# Patient Record
Sex: FEMALE | Race: WHITE | Employment: OTHER | URBAN - METROPOLITAN AREA
[De-identification: names, ages, dates, MRNs, and addresses within clinical notes are randomized per-mention and may not be internally consistent; named-entity substitution may affect disease eponyms.]

---

## 2018-04-26 ENCOUNTER — HOSPITAL ENCOUNTER (OUTPATIENT)
Age: 63
Discharge: HOME | End: 2018-04-26
Payer: MEDICARE

## 2018-04-26 DIAGNOSIS — Z00.00 ROUTINE GENERAL MEDICAL EXAMINATION AT A HEALTH CARE FACILITY: ICD-10-CM

## 2018-04-26 LAB
ALBUMIN SERPL-MCNC: 4.4 G/DL (ref 3.5–5.2)
ALP BLD-CCNC: 54 U/L (ref 35–104)
ALT SERPL-CCNC: 14 U/L (ref 0–32)
ANION GAP SERPL CALCULATED.3IONS-SCNC: 9 MMOL/L (ref 7–16)
AST SERPL-CCNC: 17 U/L (ref 0–31)
BASOPHILS ABSOLUTE: 0.05 E9/L (ref 0–0.2)
BASOPHILS RELATIVE PERCENT: 1.2 % (ref 0–2)
BILIRUB SERPL-MCNC: 1 MG/DL (ref 0–1.2)
BUN BLDV-MCNC: 21 MG/DL (ref 8–23)
CALCIUM SERPL-MCNC: 9.6 MG/DL (ref 8.6–10.2)
CHLORIDE BLD-SCNC: 99 MMOL/L (ref 98–107)
CHOLESTEROL, TOTAL: 154 MG/DL (ref 0–199)
CO2: 29 MMOL/L (ref 22–29)
CREAT SERPL-MCNC: 0.8 MG/DL (ref 0.5–1)
EOSINOPHILS ABSOLUTE: 0.13 E9/L (ref 0.05–0.5)
EOSINOPHILS RELATIVE PERCENT: 3.1 % (ref 0–6)
GFR AFRICAN AMERICAN: >60
GFR NON-AFRICAN AMERICAN: >60 ML/MIN/1.73
GLUCOSE BLD-MCNC: 90 MG/DL (ref 74–109)
HCT VFR BLD CALC: 39.1 % (ref 34–48)
HDLC SERPL-MCNC: 63 MG/DL
HEMOGLOBIN: 13.3 G/DL (ref 11.5–15.5)
IMMATURE GRANULOCYTES #: 0.01 E9/L
IMMATURE GRANULOCYTES %: 0.2 % (ref 0–5)
LDL CHOLESTEROL CALCULATED: 75 MG/DL (ref 0–99)
LYMPHOCYTES ABSOLUTE: 1.55 E9/L (ref 1.5–4)
LYMPHOCYTES RELATIVE PERCENT: 36.4 % (ref 20–42)
MCH RBC QN AUTO: 30.6 PG (ref 26–35)
MCHC RBC AUTO-ENTMCNC: 34 % (ref 32–34.5)
MCV RBC AUTO: 89.9 FL (ref 80–99.9)
MONOCYTES ABSOLUTE: 0.46 E9/L (ref 0.1–0.95)
MONOCYTES RELATIVE PERCENT: 10.8 % (ref 2–12)
NEUTROPHILS ABSOLUTE: 2.06 E9/L (ref 1.8–7.3)
NEUTROPHILS RELATIVE PERCENT: 48.3 % (ref 43–80)
PDW BLD-RTO: 12.1 FL (ref 11.5–15)
PLATELET # BLD: 172 E9/L (ref 130–450)
PMV BLD AUTO: 9.7 FL (ref 7–12)
POTASSIUM SERPL-SCNC: 4 MMOL/L (ref 3.5–5)
RBC # BLD: 4.35 E12/L (ref 3.5–5.5)
SODIUM BLD-SCNC: 137 MMOL/L (ref 132–146)
TOTAL PROTEIN: 7.5 G/DL (ref 6.4–8.3)
TRIGL SERPL-MCNC: 78 MG/DL (ref 0–149)
VLDLC SERPL CALC-MCNC: 16 MG/DL
WBC # BLD: 4.3 E9/L (ref 4.5–11.5)

## 2018-04-26 PROCEDURE — 36415 COLL VENOUS BLD VENIPUNCTURE: CPT

## 2018-04-26 PROCEDURE — 85025 COMPLETE CBC W/AUTO DIFF WBC: CPT

## 2018-04-26 PROCEDURE — 80053 COMPREHEN METABOLIC PANEL: CPT

## 2018-04-26 PROCEDURE — 80061 LIPID PANEL: CPT

## 2019-04-23 ENCOUNTER — HOSPITAL ENCOUNTER (OUTPATIENT)
Age: 64
Discharge: HOME | End: 2019-04-23
Payer: MEDICARE

## 2019-04-23 DIAGNOSIS — E78.00 PURE HYPERCHOLESTEROLEMIA: ICD-10-CM

## 2019-04-23 LAB
ALBUMIN SERPL-MCNC: 4.6 G/DL (ref 3.5–5.2)
ALP BLD-CCNC: 58 U/L (ref 35–104)
ALT SERPL-CCNC: 15 U/L (ref 0–32)
ANION GAP SERPL CALCULATED.3IONS-SCNC: 8 MMOL/L (ref 7–16)
AST SERPL-CCNC: 20 U/L (ref 0–31)
BILIRUB SERPL-MCNC: 0.7 MG/DL (ref 0–1.2)
BUN BLDV-MCNC: 19 MG/DL (ref 8–23)
CALCIUM SERPL-MCNC: 9.5 MG/DL (ref 8.6–10.2)
CHLORIDE BLD-SCNC: 100 MMOL/L (ref 98–107)
CHOLESTEROL, TOTAL: 174 MG/DL (ref 0–199)
CO2: 29 MMOL/L (ref 22–29)
CREAT SERPL-MCNC: 0.8 MG/DL (ref 0.5–1)
GFR AFRICAN AMERICAN: >60
GFR NON-AFRICAN AMERICAN: >60 ML/MIN/1.73
GLUCOSE BLD-MCNC: 89 MG/DL (ref 74–99)
HCT VFR BLD CALC: 39 % (ref 34–48)
HDLC SERPL-MCNC: 56 MG/DL
HEMOGLOBIN: 13.4 G/DL (ref 11.5–15.5)
LDL CHOLESTEROL CALCULATED: 91 MG/DL (ref 0–99)
MCH RBC QN AUTO: 31.1 PG (ref 26–35)
MCHC RBC AUTO-ENTMCNC: 34.4 % (ref 32–34.5)
MCV RBC AUTO: 90.5 FL (ref 80–99.9)
PDW BLD-RTO: 12.1 FL (ref 11.5–15)
PLATELET # BLD: 186 E9/L (ref 130–450)
PMV BLD AUTO: 9.8 FL (ref 7–12)
POTASSIUM SERPL-SCNC: 4 MMOL/L (ref 3.5–5)
RBC # BLD: 4.31 E12/L (ref 3.5–5.5)
SODIUM BLD-SCNC: 137 MMOL/L (ref 132–146)
TOTAL PROTEIN: 7.5 G/DL (ref 6.4–8.3)
TRIGL SERPL-MCNC: 133 MG/DL (ref 0–149)
VLDLC SERPL CALC-MCNC: 27 MG/DL
WBC # BLD: 5.5 E9/L (ref 4.5–11.5)

## 2019-04-23 PROCEDURE — 80061 LIPID PANEL: CPT

## 2019-04-23 PROCEDURE — 36415 COLL VENOUS BLD VENIPUNCTURE: CPT

## 2019-04-23 PROCEDURE — 80053 COMPREHEN METABOLIC PANEL: CPT

## 2019-04-23 PROCEDURE — 85027 COMPLETE CBC AUTOMATED: CPT

## 2020-05-04 ENCOUNTER — HOSPITAL ENCOUNTER (OUTPATIENT)
Age: 65
Discharge: HOME | End: 2020-05-04
Payer: MEDICARE

## 2020-05-04 LAB
ALBUMIN SERPL-MCNC: 4.6 G/DL (ref 3.5–5.2)
ALP BLD-CCNC: 65 U/L (ref 35–104)
ALT SERPL-CCNC: 11 U/L (ref 0–32)
ANION GAP SERPL CALCULATED.3IONS-SCNC: 9 MMOL/L (ref 7–16)
AST SERPL-CCNC: 19 U/L (ref 0–31)
BILIRUB SERPL-MCNC: 0.7 MG/DL (ref 0–1.2)
BUN BLDV-MCNC: 17 MG/DL (ref 8–23)
CALCIUM SERPL-MCNC: 9.7 MG/DL (ref 8.6–10.2)
CHLORIDE BLD-SCNC: 101 MMOL/L (ref 98–107)
CHOLESTEROL, TOTAL: 164 MG/DL (ref 0–199)
CO2: 28 MMOL/L (ref 22–29)
CREAT SERPL-MCNC: 0.9 MG/DL (ref 0.5–1)
GFR AFRICAN AMERICAN: >60
GFR NON-AFRICAN AMERICAN: >60 ML/MIN/1.73
GLUCOSE BLD-MCNC: 96 MG/DL (ref 74–99)
HCT VFR BLD CALC: 38 % (ref 34–48)
HDLC SERPL-MCNC: 58 MG/DL
HEMOGLOBIN: 13 G/DL (ref 11.5–15.5)
LDL CHOLESTEROL CALCULATED: 85 MG/DL (ref 0–99)
MCH RBC QN AUTO: 30.8 PG (ref 26–35)
MCHC RBC AUTO-ENTMCNC: 34.2 % (ref 32–34.5)
MCV RBC AUTO: 90 FL (ref 80–99.9)
PDW BLD-RTO: 12 FL (ref 11.5–15)
PLATELET # BLD: 211 E9/L (ref 130–450)
PMV BLD AUTO: 9.4 FL (ref 7–12)
POTASSIUM SERPL-SCNC: 4.5 MMOL/L (ref 3.5–5)
RBC # BLD: 4.22 E12/L (ref 3.5–5.5)
SODIUM BLD-SCNC: 138 MMOL/L (ref 132–146)
TOTAL PROTEIN: 7.9 G/DL (ref 6.4–8.3)
TRIGL SERPL-MCNC: 105 MG/DL (ref 0–149)
VLDLC SERPL CALC-MCNC: 21 MG/DL
WBC # BLD: 5.7 E9/L (ref 4.5–11.5)

## 2020-05-04 PROCEDURE — 36415 COLL VENOUS BLD VENIPUNCTURE: CPT

## 2020-05-04 PROCEDURE — 80053 COMPREHEN METABOLIC PANEL: CPT

## 2020-05-04 PROCEDURE — 80061 LIPID PANEL: CPT

## 2020-05-04 PROCEDURE — 85027 COMPLETE CBC AUTOMATED: CPT

## 2020-06-16 ENCOUNTER — HOSPITAL ENCOUNTER (OUTPATIENT)
Age: 65
Discharge: HOME | End: 2020-06-16
Payer: MEDICARE

## 2020-06-16 LAB
C-REACTIVE PROTEIN: <0.1 MG/DL (ref 0–0.4)
RHEUMATOID FACTOR: 10 IU/ML (ref 0–13)
SEDIMENTATION RATE, ERYTHROCYTE: 5 MM/HR (ref 0–20)

## 2020-06-16 PROCEDURE — 86038 ANTINUCLEAR ANTIBODIES: CPT

## 2020-06-16 PROCEDURE — 85651 RBC SED RATE NONAUTOMATED: CPT

## 2020-06-16 PROCEDURE — 86431 RHEUMATOID FACTOR QUANT: CPT

## 2020-06-16 PROCEDURE — 86039 ANTINUCLEAR ANTIBODIES (ANA): CPT

## 2020-06-16 PROCEDURE — 86140 C-REACTIVE PROTEIN: CPT

## 2020-06-16 PROCEDURE — 36415 COLL VENOUS BLD VENIPUNCTURE: CPT

## 2020-06-18 LAB
ANA PATTERN: ABNORMAL
ANA TITER: ABNORMAL
ANTI-NUCLEAR ANTIBODY (ANA): POSITIVE

## 2020-07-08 ENCOUNTER — HOSPITAL ENCOUNTER (OUTPATIENT)
Dept: GENERAL RADIOLOGY | Age: 65
Discharge: HOME | End: 2020-07-10
Payer: MEDICARE

## 2020-07-08 ENCOUNTER — HOSPITAL ENCOUNTER (OUTPATIENT)
Age: 65
Discharge: HOME | End: 2020-07-08
Payer: MEDICARE

## 2020-07-08 ENCOUNTER — OFFICE VISIT (OUTPATIENT)
Dept: NEUROLOGY | Age: 65
End: 2020-07-08
Payer: MEDICARE

## 2020-07-08 ENCOUNTER — HOSPITAL ENCOUNTER (OUTPATIENT)
Age: 65
Discharge: HOME | End: 2020-07-10
Payer: MEDICARE

## 2020-07-08 VITALS
WEIGHT: 120 LBS | BODY MASS INDEX: 22.66 KG/M2 | SYSTOLIC BLOOD PRESSURE: 130 MMHG | DIASTOLIC BLOOD PRESSURE: 100 MMHG | TEMPERATURE: 98.7 F | HEIGHT: 61 IN

## 2020-07-08 PROBLEM — M54.42 CHRONIC BILATERAL LOW BACK PAIN WITH SCIATICA: Chronic | Status: ACTIVE | Noted: 2020-07-08

## 2020-07-08 PROBLEM — R20.2 NUMBNESS AND TINGLING IN BOTH HANDS: Status: ACTIVE | Noted: 2020-07-08

## 2020-07-08 PROBLEM — M54.40 CHRONIC BILATERAL LOW BACK PAIN WITH SCIATICA: Chronic | Status: ACTIVE | Noted: 2020-07-08

## 2020-07-08 PROBLEM — M54.41 CHRONIC BILATERAL LOW BACK PAIN WITH SCIATICA: Chronic | Status: ACTIVE | Noted: 2020-07-08

## 2020-07-08 PROBLEM — G89.29 CHRONIC BILATERAL LOW BACK PAIN WITH SCIATICA: Chronic | Status: ACTIVE | Noted: 2020-07-08

## 2020-07-08 PROBLEM — R20.0 NUMBNESS AND TINGLING IN BOTH HANDS: Status: ACTIVE | Noted: 2020-07-08

## 2020-07-08 LAB
C-REACTIVE PROTEIN: <0.1 MG/DL (ref 0–0.4)
FOLATE: >20 NG/ML (ref 4.8–24.2)
MAGNESIUM: 2.5 MG/DL (ref 1.6–2.6)
TOTAL CK: 57 U/L (ref 20–180)
TSH SERPL DL<=0.05 MIU/L-ACNC: 5.56 UIU/ML (ref 0.27–4.2)
VITAMIN B-12: 1358 PG/ML (ref 211–946)

## 2020-07-08 PROCEDURE — 82550 ASSAY OF CK (CPK): CPT

## 2020-07-08 PROCEDURE — 3017F COLORECTAL CA SCREEN DOC REV: CPT | Performed by: PSYCHIATRY & NEUROLOGY

## 2020-07-08 PROCEDURE — 86140 C-REACTIVE PROTEIN: CPT

## 2020-07-08 PROCEDURE — G8420 CALC BMI NORM PARAMETERS: HCPCS | Performed by: PSYCHIATRY & NEUROLOGY

## 2020-07-08 PROCEDURE — 86618 LYME DISEASE ANTIBODY: CPT

## 2020-07-08 PROCEDURE — 84165 PROTEIN E-PHORESIS SERUM: CPT

## 2020-07-08 PROCEDURE — 86235 NUCLEAR ANTIGEN ANTIBODY: CPT

## 2020-07-08 PROCEDURE — 72100 X-RAY EXAM L-S SPINE 2/3 VWS: CPT

## 2020-07-08 PROCEDURE — 84443 ASSAY THYROID STIM HORMONE: CPT

## 2020-07-08 PROCEDURE — 82085 ASSAY OF ALDOLASE: CPT

## 2020-07-08 PROCEDURE — G8427 DOCREV CUR MEDS BY ELIG CLIN: HCPCS | Performed by: PSYCHIATRY & NEUROLOGY

## 2020-07-08 PROCEDURE — 84207 ASSAY OF VITAMIN B-6: CPT

## 2020-07-08 PROCEDURE — 83735 ASSAY OF MAGNESIUM: CPT

## 2020-07-08 PROCEDURE — 83921 ORGANIC ACID SINGLE QUANT: CPT

## 2020-07-08 PROCEDURE — 99204 OFFICE O/P NEW MOD 45 MIN: CPT | Performed by: PSYCHIATRY & NEUROLOGY

## 2020-07-08 PROCEDURE — 1036F TOBACCO NON-USER: CPT | Performed by: PSYCHIATRY & NEUROLOGY

## 2020-07-08 PROCEDURE — 36415 COLL VENOUS BLD VENIPUNCTURE: CPT

## 2020-07-08 PROCEDURE — 82607 VITAMIN B-12: CPT

## 2020-07-08 PROCEDURE — 84425 ASSAY OF VITAMIN B-1: CPT

## 2020-07-08 PROCEDURE — 82746 ASSAY OF FOLIC ACID SERUM: CPT

## 2020-07-08 PROCEDURE — 72050 X-RAY EXAM NECK SPINE 4/5VWS: CPT

## 2020-07-08 RX ORDER — COVID-19 ANTIGEN TEST
KIT MISCELLANEOUS DAILY
COMMUNITY
End: 2021-05-18

## 2020-07-08 ASSESSMENT — ENCOUNTER SYMPTOMS
BACK PAIN: 1
GASTROINTESTINAL NEGATIVE: 1
RESPIRATORY NEGATIVE: 1
ALLERGIC/IMMUNOLOGIC NEGATIVE: 1
EYES NEGATIVE: 1

## 2020-07-08 NOTE — PROGRESS NOTES
meloxicam (MOBIC) 15 MG tablet Take 1 tablet by mouth daily (Patient not taking: Reported on 7/8/2020) 30 tablet 0    cyclobenzaprine (FLEXERIL) 10 MG tablet Take 1 tablet by mouth at bedtime to 3 times a day as needed for spasm (Patient not taking: Reported on 7/8/2020) 30 tablet 0    LOVAZA 1 g capsule Take 2 capsules by mouth 2 times daily 120 capsule 11     No current facility-administered medications for this visit. Allergies   Allergen Reactions    Penicillins Rash       Patient Active Problem List   Diagnosis    Closed fracture of right fibula    Numbness and tingling in both hands    Chronic bilateral low back pain with sciatica       Past Medical History:   Diagnosis Date    Chronic bilateral low back pain with sciatica 7/8/2020    Gout     Numbness and tingling in both hands 7/8/2020    Osteoarthritis     Reactive hypoglycemia     Scoliosis        Past Surgical History:   Procedure Laterality Date    HYSTERECTOMY         No family history on file. No history of hereditary neurological disorders.     Social History     Socioeconomic History    Marital status:      Spouse name: Not on file    Number of children: Not on file    Years of education: Not on file    Highest education level: Not on file   Occupational History    Not on file   Social Needs    Financial resource strain: Not on file    Food insecurity     Worry: Not on file     Inability: Not on file    Transportation needs     Medical: Not on file     Non-medical: Not on file   Tobacco Use    Smoking status: Never Smoker    Smokeless tobacco: Never Used   Substance and Sexual Activity    Alcohol use: No    Drug use: No    Sexual activity: Not on file   Lifestyle    Physical activity     Days per week: Not on file     Minutes per session: Not on file    Stress: Not on file   Relationships    Social connections     Talks on phone: Not on file     Gets together: Not on file     Attends Restorationism service: Not on file     Active member of club or organization: Not on file     Attends meetings of clubs or organizations: Not on file     Relationship status: Not on file    Intimate partner violence     Fear of current or ex partner: Not on file     Emotionally abused: Not on file     Physically abused: Not on file     Forced sexual activity: Not on file   Other Topics Concern    Not on file   Social History Narrative    Not on file     Review of Systems   Constitutional: Negative. HENT: Negative. Eyes: Negative. Respiratory: Negative. Cardiovascular: Negative. Gastrointestinal: Negative. Endocrine: Negative. Genitourinary: Negative. Musculoskeletal: Positive for arthralgias, back pain, joint swelling and myalgias. Reports chronic lower back pain, bilateral, leg pain. Denies posterior cervicalgia or cervical radicular symptoms. Skin: Negative. Allergic/Immunologic: Negative. Neurological: Positive for weakness and numbness. Hematological: Negative. Psychiatric/Behavioral: The patient is nervous/anxious. All other systems reviewed and are negative. Neurologic Exam:  BP (!) 130/100 (Site: Right Upper Arm, Position: Sitting, Cuff Size: Medium Adult)   Temp 98.7 °F (37.1 °C)   Ht 5' 1\" (1.549 m)   Wt 120 lb (54.4 kg)   BMI 22.67 kg/m²   General appearance: Alert, cooperative, anxious, well-nourished, well-groomed, seated on the exam table, no acute distress  HEENT: Normocephalic/atraumatic. Neck: Supple  Cardiac: RRR  Respiratory: grossly clear  Extremities: No edema, erythema or cyanosis  Skin: No apparent lesions or rashes  Musculoskeletal: No fasciculations or tremors, negative bilateral straight leg raising test, no foot drop, no truncal sensory level.   Negative Tinel's test, bilateral.  Mental Status: Alert, oriented x3  Speech/Language: Clear, grossly fluent  Attention span/Concentration: Mildly reduced with easy distractibility related to anxiety level  Affect/Mood: Moderate anxiety level, tangential  Insight/Judgement: Meghanromainec[address]ia [address] of Knowledge/Current events: Unable to accurately assess, tangential, multisystem complaints. CN II-XII:     Pupils: Equal, reactive to light, 1.5 mm     EOM's: Full without nystagmus  Visual Fields: Full to confrontation  Fundi: Grossly unremarkable, miosis to light  CN V: normal V1-V3  CN VII: No facial droop  CN VIII: Hearing grossly intact  CN IX-XII: Tongue midline  SCM/Trapezii: 5/5 power  Motor: Grossly intact 5/5 power in all muscle groups of the upper and lower extremities when testing both limbs together, no tremor or drift, intact fine motor function of both hands, symmetric. DTR's: 1+ and symmetric in the upper extremities, 1-2+ and symmetric in the lower extremities, no ankle clonus, plantar responses are flexor. Sensory: Grossly intact subjectively to light touch and sharp stick testing. No truncal sensory level. Intact vibratory sensation at ankles/wrists, symmetric. Normal cold temperature sensation distally. Coordination/Gait: Grossly intact finger-to-nose and heel-to-shin testing. No truncal or cerebellar gait ataxia, normal tandem gait, two-step turns. Assessment/Plan:  1. Longstanding history of numbness and tingling of both hands with perception of weakness or clumsiness described. No cervical radicular symptoms or chronic posterior cervicalgia associated. An NCS/EMG study of the bilateral upper extremities is ordered to evaluate for carpal tunnel syndrome or other focal mononeuropathy. 2.  Chronic lower back pain, bilateral, bilateral lower extremity pain complaints consistent with sciatica, longstanding. 3.  Patient has perception of left-sided weakness involving her left arm and leg which cannot be reproduced on the neurologic exam and she is concerned she \"may have MS\".   Her symptoms and clinical neurologic exam appears to be stable and nonlocalizing and her medical history is not suggestive of a clinical diagnosis of remitting/relapsing form of demyelinating disease. 4.  Additional diagnostic tests ordered include plain x-rays of the cervical and lumbar spines to evaluate for DJD, spondylosis, and MRI scan of the brain and cervical spine with and without contrast to evaluate for demyelinating disease, infarct, a central process, herniated disc, central spinal canal stenosis and lumbar MRI without contrast to evaluate for lumbar degenerative disc disease, spinal stenosis. I explained that the MRI studies would need to be authorized and processed through her insurer before they could be scheduled. 5.  Additional lab tests are ordered and once resulted she will be informed accordingly. 6.  Follow-up in the Neurology clinic in 4 weeks if clinically indicated. Test results will also be discussed at the time of her NCS/EMG appointment and summarized for her as they become available. Sincerely,      Shona Hartmann MD    This note was created using speech recognition transcription software. Despite proofreading, there may be several typographical errors present that may affect the meaning of the content. Please call with any questions. Note: More than 50% of this 40-minute face-face visit time included counseling and coordination of care based on clinical impression, review of test results, treatment plan, risk factor reduction and patient and/or family education.     Orders Placed This Encounter   Procedures    XR CERVICAL SPINE (4-5 VIEWS)     Standing Status:   Future     Standing Expiration Date:   8/8/2020     Order Specific Question:   Reason for exam:     Answer:   evaluate for DJD, spondylosis of C-spine    MRI Brain W WO Contrast     Standing Status:   Future     Standing Expiration Date:   8/8/2020     Order Specific Question:   Reason for exam:     Answer:   evaluation for demyelinating disease, r/o infarct, mass, mass effect, reports she feels weak on left side    MRI Cervical Spine W WO Contrast     Standing Status:   Future     Standing Expiration Date:   7/8/2021     Order Specific Question:   Reason for exam:     Answer:   evaluate for demyelinating disease, disc herniatio, stenosis, DJD     Order Specific Question:   Reason for exam:     Answer:   reports hx left-sided weakness    MRI Lumbar Spine WO Contrast     Standing Status:   Future     Standing Expiration Date:   7/8/2021     Order Specific Question:   Reason for exam:     Answer:   r/o DJD, disc herniation, stenosis    XR LUMBAR SPINE (2-3 VIEWS)     Standing Status:   Future     Standing Expiration Date:   8/8/2020     Order Specific Question:   Reason for exam:     Answer:   evaluate for DJD, spondylosis, compression fx    CK     Standing Status:   Future     Standing Expiration Date:   7/8/2021    Aldolase     Standing Status:   Future     Standing Expiration Date:   7/8/2021    C-Reactive Protein     Standing Status:   Future     Standing Expiration Date:   7/8/2021    Magnesium     Standing Status:   Future     Standing Expiration Date:   7/8/2021    Vitamin B12 & Folate     Standing Status:   Future     Standing Expiration Date:   7/8/2021    Electrophoresis Protein, Serum without Reflex to Immunofixation     Standing Status:   Future     Standing Expiration Date:   7/8/2021    Methylmalonic Acid, Serum     Standing Status:   Future     Standing Expiration Date:   7/8/2021    Vitamin B6     Standing Status:   Future     Standing Expiration Date:   7/8/2021    TSH without Reflex     Standing Status:   Future     Standing Expiration Date:   7/8/2021    Sjogren's Ab (SS-A, SS-B)     Standing Status:   Future     Standing Expiration Date:   7/8/2021    Lyme Disease Acute Reflexive Panel     Standing Status:   Future     Standing Expiration Date:   7/8/2021    Vitamin B1     Standing Status:   Future     Standing Expiration Date:   7/8/2021    EMG     Standing Status:   Future     Standing Expiration Date:   7/22/2021     Scheduling Instructions:      Schedule with Dr. Baltazar Ouaquaga, 7/22 date open     Order Specific Question:   Which body part? Answer:   Rolf sewell.  evaluate for CTS, neuropathy

## 2020-07-09 ENCOUNTER — TELEPHONE (OUTPATIENT)
Dept: NEUROLOGY | Age: 65
End: 2020-07-09

## 2020-07-09 NOTE — TELEPHONE ENCOUNTER
----- Message from Baldemar De La Fuente MD sent at 7/8/2020  5:02 PM EDT -----  Notify pt C-.spine XR shows mod.  C5/6 degen disc disease

## 2020-07-10 LAB
ALBUMIN SERPL-MCNC: 3.8 G/DL (ref 3.5–4.7)
ALPHA-1-GLOBULIN: 0.3 G/DL (ref 0.2–0.4)
ALPHA-2-GLOBULIN: 0.7 G/FL (ref 0.5–1)
BETA GLOBULIN: 1 G/DL (ref 0.8–1.3)
ELECTROPHORESIS: NORMAL
ENA TO SSA (RO) ANTIBODY: NEGATIVE
ENA TO SSB (LA) ANTIBODY: NEGATIVE
GAMMA GLOBULIN: 1.6 G/DL (ref 0.7–1.6)
TOTAL PROTEIN: 7.4 G/DL (ref 6.4–8.3)

## 2020-07-11 LAB
ALDOLASE: 4.6 U/L (ref 1.5–8.1)
LYME, EIA: 0.67 LIV (ref 0–1.2)

## 2020-07-12 LAB
METHYLMALONIC ACID: 0.12 UMOL/L (ref 0–0.4)
VITAMIN B6: 183.9 NMOL/L (ref 20–125)

## 2020-07-13 LAB — VITAMIN B1 WHOLE BLOOD: 167 NMOL/L (ref 70–180)

## 2020-07-23 ENCOUNTER — OFFICE VISIT (OUTPATIENT)
Dept: NEUROLOGY | Age: 65
End: 2020-07-23
Payer: MEDICARE

## 2020-07-23 ENCOUNTER — TELEPHONE (OUTPATIENT)
Dept: NEUROLOGY | Age: 65
End: 2020-07-23

## 2020-07-23 VITALS
SYSTOLIC BLOOD PRESSURE: 104 MMHG | DIASTOLIC BLOOD PRESSURE: 90 MMHG | TEMPERATURE: 97.1 F | HEIGHT: 61 IN | WEIGHT: 124 LBS | BODY MASS INDEX: 23.41 KG/M2

## 2020-07-23 PROBLEM — G56.03 BILATERAL CARPAL TUNNEL SYNDROME: Chronic | Status: ACTIVE | Noted: 2020-07-23

## 2020-07-23 PROBLEM — G56.22 ULNAR NEUROPATHY OF LEFT UPPER EXTREMITY: Chronic | Status: ACTIVE | Noted: 2020-07-23

## 2020-07-23 PROCEDURE — 95885 MUSC TST DONE W/NERV TST LIM: CPT | Performed by: PSYCHIATRY & NEUROLOGY

## 2020-07-23 PROCEDURE — 1036F TOBACCO NON-USER: CPT | Performed by: PSYCHIATRY & NEUROLOGY

## 2020-07-23 PROCEDURE — G8428 CUR MEDS NOT DOCUMENT: HCPCS | Performed by: PSYCHIATRY & NEUROLOGY

## 2020-07-23 PROCEDURE — 99212 OFFICE O/P EST SF 10 MIN: CPT | Performed by: PSYCHIATRY & NEUROLOGY

## 2020-07-23 PROCEDURE — 95912 NRV CNDJ TEST 11-12 STUDIES: CPT | Performed by: PSYCHIATRY & NEUROLOGY

## 2020-07-23 PROCEDURE — 95886 MUSC TEST DONE W/N TEST COMP: CPT | Performed by: PSYCHIATRY & NEUROLOGY

## 2020-07-23 PROCEDURE — G8420 CALC BMI NORM PARAMETERS: HCPCS | Performed by: PSYCHIATRY & NEUROLOGY

## 2020-07-23 PROCEDURE — 3017F COLORECTAL CA SCREEN DOC REV: CPT | Performed by: PSYCHIATRY & NEUROLOGY

## 2020-07-23 NOTE — PROGRESS NOTES
[facility]  Electrodiagnostic Laboratory  *Accredited by the [address] [place] [address] with exemplary status  [address] [place], [place]  Phone: [phone]  Fax: [phone]    Referring Provider: Gloria Kay MD  Primary Care Physician: Jacky Galeano MD  Patient Name: Deborah Islas  Patient YOB: 1955  Gender: female  BMI: Body mass index is 23.43 kg/m². Blood pressure (!) 104/90, temperature 97.1 °F (36.2 °C), height 5' 1\" (1.549 m), weight 124 lb (56.2 kg). 7/23/2020    Description of clinical problem: c/o chronic numbness and tingling in both hands, hand weakness. Hx cervical degenerative disc disease. (Please refer to recent Neurology consult note of 7/8/2020 for additional information if needed.)    Chief Complaint   Patient presents with   Pryor Procedure     EMG Medhat UE     Pain Yes   ; Numbness/tingling  Yes; Weakness  Yes     Brief physical exam:   Sensory deficit No; Weakness Yes; Atrophy  Yes; Reflex abnormality No  Limited neurologic exam performed of the bilateral upper extremities showed grossly intact 5/5 power when testing both limbs together without a tremor or drift and intact fine motor function of both hands. There is reduced muscle bulk or flattening of the bilateral APB (thenar) muscles. Motor tone is grossly intact. DTRs: 1+ and symmetric in the upper extremities. Negative Tinel's test to percussion over both wrists. No fasciculations or tremors. Sensory modalities to light touch and sharp stick testing are grossly intact and symmetric. Study Limitations: None    Full Name: Blossom Boateng Gender: Female  MRN: [id number] YOB: 1955      Visit Date: 7/23/2020 09:20  Age: 60 Years 6 Months Old  Examining Physician: Dr. Jammie Mcclure   Referring Physician: Dr. Jammie Mcclure   Technician: Sammie Jimenez   Height: 5 feet 1 inch  Weight: 124 lbs  Notes: Numbness /Tingling Both Hands      Motor NCS      Nerve / Sites Latency Amplitude Amp. 1-2 Distance Lat Diff Velocity Temp.    ms mV % cm ms m/s °C   R Median - APB      Wrist 6.30 8.0 100 8   32      Elbow 10.00 7.5 93.8 18 3.70 49 32   L Median - APB      Wrist 5.63 5.3 100 8   32.1      Elbow 9.22 5.1 95.8 18 3.59 50 32.1   R Ulnar - ADM      Wrist 3.18 8.7 100 8   32.2      B. Elbow 6.15 7.6 87.1 17 2.97 57 32.1      A. Elbow 7.81 7.0 80 10 1.67 60 32.1   L Ulnar - ADM      Wrist 3.33 10.5 100 8   32.1      B. Elbow 5.99 10.1 96.2 17 2.66 64 32.1      A. Elbow 9.27 7.8 74.5 10 3.28 30 32.1   L Ulnar - FDI      Wrist 3.75 5.7 100    32      B. Elbow 6.61 4.0 69.8 18 2.86 63 32      A. Elbow 8.80 4.1 71.4 10 2.19 46 32                 Sensory NCS      Nerve / Sites Onset Lat Peak Lat PP Amp Amp. 1-2 Distance Velocity Temp.    ms ms µV % cm m/s °C   L Median - Digit II (Antidromic)      Mid Palm 1.61 2.55 64.7 100 7 43 32      Wrist 3.85 4.74 35.0 35.1 14 36 32   R Median - Digit II (Antidromic)      Mid Palm 1.56 2.40 68.6 100 7 45 32      Wrist 3.80 4.79 45.3 87.9 14 37 32   L Ulnar - Digit V (Antidromic)      Wrist 2.86 3.75 85.8 100 14 49 32   R Ulnar - Digit V (Antidromic)      Wrist 3.02 3.91 63.3 100 14 46 31.9   R Radial - Anatomical  (Forearm)      Forearm 1.67 2.24 50.7 100 10 60 32.1   L Radial - Anatomical  (Forearm)      Forearm 1.77 2.40 52.9 100 10 56 32.1   L Dorsal ulnar cutaneous - Hand dorsum (Forearm)      Forearm 1.67 2.24 9.0 100 8 48 32                     F  Wave      Nerve F Lat M Lat F-M Lat    ms ms ms   R Median - APB 28.0 5.7 22.3   R Ulnar - ADM 25.1 3.5 21.6   L Median - APB 29.9 5.6 24.3   L Ulnar - ADM 26.5 1.4 25.1       EMG         EMG Summary Table     Spontaneous MUAP Recruitment   Muscle IA Fib PSW Fasc H.F. Amp Dur. PPP Pattern   R. Abductor pollicis brev Normal None None None None Giant 3+ None Decr   L. First dors inteross Normal None None None None 2+ 2+ None Decr   L. Abductor pollicis brev Normal None None None None 2+ 2+ None Decr   L.  Adductor pollicis Normal None None None None 2+ 2+ None Decr   L. Flexor pollicis longus Normal None None None None Normal Normal None Normal   L. Extensor digit comm Normal None None None None Normal Normal None Normal   L. Pronator teres Normal None None None None Normal Normal None Normal   L. Biceps brachii Normal None None None None Normal Normal None Normal   L. Flexor carpi ulnaris Normal None None None None 1+ 1+ None Decr   L. Triceps brachii Normal None None None None Normal Normal None Normal   L. Deltoid Normal None None None None Normal Normal None Normal   R. Flexor pollicis long Normal None None None None Normal Normal None Normal   L. Cervical paraspinals (low) Normal None None None None -- -- -- --       Summary of Findings:   Nerve conduction studies:   · The following nerve conduction studies were abnormal:   · The left and right median sensory nerve conductions recording from the second digits are prolonged in distal latency with normal amplitudes. · The left and right median motor nerve conductions (recording from the abductor pollicis brevis muscles) are prolonged in distal latency with normal amplitudes and borderline normal motor nerve conduction velocities. · The left ulnar motor nerve conductions (recording from the abductor digiti minimi and first dorsal interosseous muscles) are normal in distal latency, with reduced amplitudes recording from the first dorsal interosseous muscle as listed, reduced amplitude above the level of the elbow recording from the abductor digit minimi muscle, and slowed motor nerve conduction velocities above the level of the elbow as listed. · All other nerve conduction studies listed in the table above were normal in latency, amplitude and conduction velocity. Needle EMG:   · Needle EMG was performed using a concentric needle.   · The following abnormalities were seen on needle EMG: Enlarged motor unit potentials in duration and amplitude with decreased recruitment (loss of motor

## 2020-07-23 NOTE — TELEPHONE ENCOUNTER
Notify patient of lab results, dated 7/8/2020, 7/13,  additional lab tests show no significant findings. Can mail a copy to her of her lab reports if she wishes.

## 2020-07-28 PROBLEM — M81.6 LOCALIZED OSTEOPOROSIS WITHOUT CURRENT PATHOLOGICAL FRACTURE: Status: ACTIVE | Noted: 2020-07-28

## 2020-07-31 ENCOUNTER — HOSPITAL ENCOUNTER (OUTPATIENT)
Dept: MRI IMAGING | Age: 65
Discharge: HOME | End: 2020-08-02
Payer: MEDICARE

## 2020-07-31 PROCEDURE — 6360000004 HC RX CONTRAST MEDICATION: Performed by: RADIOLOGY

## 2020-07-31 PROCEDURE — 72156 MRI NECK SPINE W/O & W/DYE: CPT

## 2020-07-31 PROCEDURE — A9579 GAD-BASE MR CONTRAST NOS,1ML: HCPCS | Performed by: RADIOLOGY

## 2020-07-31 PROCEDURE — 70553 MRI BRAIN STEM W/O & W/DYE: CPT

## 2020-07-31 PROCEDURE — 72148 MRI LUMBAR SPINE W/O DYE: CPT

## 2020-07-31 RX ADMIN — GADOTERIDOL 11 ML: 279.3 INJECTION, SOLUTION INTRAVENOUS at 15:05

## 2020-08-03 ENCOUNTER — TELEPHONE (OUTPATIENT)
Dept: NEUROLOGY | Age: 65
End: 2020-08-03

## 2020-08-03 NOTE — TELEPHONE ENCOUNTER
Pt notified of results, states she has a f/u appt 8/5 and will discuss results and any questions then.

## 2020-08-03 NOTE — TELEPHONE ENCOUNTER
----- Message from Lupe Serrato MD sent at 7/31/2020  9:24 PM EDT -----  MRI results-  MR brain- minimal.nonspecific chronic small vessel arteriosclerosis, small vessel chronic infarct of left cerebellum. No s/o white matter disease  of brain or enhancing lesions. MR C-spine- multilevel DDD, most significant at C5/6 level. Upper thoracic spine shows scoliosis. MR lumbar spine-multilevel DDD, scoliosis upper lumbar spine, no compression Fx.

## 2020-08-05 ENCOUNTER — OFFICE VISIT (OUTPATIENT)
Dept: NEUROLOGY | Age: 65
End: 2020-08-05
Payer: MEDICARE

## 2020-08-05 VITALS
DIASTOLIC BLOOD PRESSURE: 80 MMHG | TEMPERATURE: 97.5 F | SYSTOLIC BLOOD PRESSURE: 110 MMHG | BODY MASS INDEX: 23.41 KG/M2 | WEIGHT: 124 LBS | HEIGHT: 61 IN

## 2020-08-05 PROBLEM — M47.816 LUMBAR SPONDYLOSIS: Chronic | Status: ACTIVE | Noted: 2020-08-05

## 2020-08-05 PROBLEM — M41.25 OTHER IDIOPATHIC SCOLIOSIS, THORACOLUMBAR REGION: Status: ACTIVE | Noted: 2020-08-05

## 2020-08-05 PROBLEM — I63.81 RIGHT-SIDED LACUNAR INFARCTION (HCC): Chronic | Status: ACTIVE | Noted: 2020-08-05

## 2020-08-05 PROBLEM — M47.812 CERVICAL SPONDYLOSIS: Chronic | Status: ACTIVE | Noted: 2020-08-05

## 2020-08-05 PROCEDURE — G8427 DOCREV CUR MEDS BY ELIG CLIN: HCPCS | Performed by: PSYCHIATRY & NEUROLOGY

## 2020-08-05 PROCEDURE — 1036F TOBACCO NON-USER: CPT | Performed by: PSYCHIATRY & NEUROLOGY

## 2020-08-05 PROCEDURE — 3017F COLORECTAL CA SCREEN DOC REV: CPT | Performed by: PSYCHIATRY & NEUROLOGY

## 2020-08-05 PROCEDURE — G8420 CALC BMI NORM PARAMETERS: HCPCS | Performed by: PSYCHIATRY & NEUROLOGY

## 2020-08-05 PROCEDURE — 99213 OFFICE O/P EST LOW 20 MIN: CPT | Performed by: PSYCHIATRY & NEUROLOGY

## 2020-08-05 ASSESSMENT — ENCOUNTER SYMPTOMS
EYES NEGATIVE: 1
GASTROINTESTINAL NEGATIVE: 1
RESPIRATORY NEGATIVE: 1
ALLERGIC/IMMUNOLOGIC NEGATIVE: 1

## 2020-08-05 NOTE — PROGRESS NOTES
30 tablet 3    miglitol (GLYSET) 25 MG tablet Take 25 mg by mouth 2 times daily      ALPRAZolam (XANAX) 0.25 MG tablet Take 0.25 mg by mouth 2 times daily as needed.  Naproxen Sodium 220 MG CAPS Take by mouth daily      pravastatin (PRAVACHOL) 20 MG tablet Take 1 tablet by mouth daily 90 tablet 1    cyclobenzaprine (FLEXERIL) 10 MG tablet Take 1 tablet by mouth at bedtime to 3 times a day as needed for spasm (Patient taking differently: as needed Take 1 tablet by mouth at bedtime to 3 times a day as needed for spasm) 30 tablet 0    LOVAZA 1 g capsule Take 2 capsules by mouth 2 times daily 120 capsule 11    ibuprofen (ADVIL;MOTRIN) 200 MG tablet Take 200 mg by mouth every 6 hours as needed. No current facility-administered medications for this visit.         Allergies   Allergen Reactions    Penicillins Rash       Patient Active Problem List   Diagnosis    Closed fracture of right fibula    Numbness and tingling in both hands    Chronic bilateral low back pain with sciatica    Bilateral carpal tunnel syndrome    Ulnar neuropathy of left upper extremity    Localized osteoporosis without current pathological fracture    Cervical spondylosis    Lumbar spondylosis    Other idiopathic scoliosis, thoracolumbar region    Right-sided lacunar infarction [facility])       Past Medical History:   Diagnosis Date    Bilateral carpal tunnel syndrome 7/23/2020    Cervical spondylosis 8/5/2020    Chronic bilateral low back pain with sciatica 7/8/2020    Gout     Lumbar spondylosis 8/5/2020    Numbness and tingling in both hands 7/8/2020    Osteoarthritis     Other idiopathic scoliosis, thoracolumbar region 8/5/2020    Reactive hypoglycemia     Right-sided lacunar infarction ([facility] [address] 8/5/2020    Rt. cerebellum, chronic, remote    Scoliosis     Ulnar neuropathy of left upper extremity 7/23/2020       Past Surgical History:   Procedure Laterality Date    HYSTERECTOMY         No family history on Adult)   Temp 97.5 °F (36.4 °C)   Ht 5' 1\" (1.549 m)   Wt 124 lb (56.2 kg)   BMI 23.43 kg/m²    Mental Status: Alert, oriented x3. Speech is clear and language grossly fluent. Comprehension ability is grossly intact. She is moderately anxious. CN's II-XII: Remains grossly intact throughout. Pupils are equal and reactive to light. EOMs are intact light nystagmus. Visual fields are grossly full. Facial expression and sensation are normal and symmetric. Hearing is grossly intact. The tongue is midline. Motor/Sensory Exam: Grossly intact 5/5 power in the upper and lower extremities without tremor or drift. No pathologic reflexes. Sensory modalities remain grossly intact subjectively. Coordination/Gait: No limb dysmetria or gait ataxia. Assessment/Plan:   1. Chronic bilateral carpal tunnel syndrome, left greater than right and  chronic left ulnar neuropathy. 2.  Cervical and lumbar degenerative disc disease, more significant at the C5-6 level and chronic compression fracture at the L5 vertebral body, levoscoliosis of thoracolumbar spine, no significant central canal stenosis. 3.  Remote, chronic small vessel lacunar right cerebellar infarct, asymptomatic. Medical management of any significant medical/stroke risk factors such as hyperlipidemia are otherwise recommended. 4.  She will be continue her medical follow-up with your office for general medical preventive care and Rheumatology PRN. She reports she will be seeing a hand surgeon for evaluation of bilateral carpal tunnel syndrome. Conservative management of her cervical and lumbar degenerative disc disease and spondylosis would be primarily recommended. Sincerely,      Aimee Gillis MD    Please note this report has been partially produced using speech recognition software and may cause contain errors related to that system including grammar, punctuation and spelling or words and phrases that may not seem appropriate.  If there are questions or concerns please feel free to contact me to clarify.

## 2020-09-18 ENCOUNTER — TELEPHONE (OUTPATIENT)
Dept: ORTHOPEDIC SURGERY | Age: 65
End: 2020-09-18

## 2020-09-22 ENCOUNTER — OFFICE VISIT (OUTPATIENT)
Dept: ORTHOPEDIC SURGERY | Age: 65
End: 2020-09-22
Payer: MEDICARE

## 2020-09-22 VITALS — WEIGHT: 120 LBS | TEMPERATURE: 96.4 F | RESPIRATION RATE: 20 BRPM | HEIGHT: 61 IN | BODY MASS INDEX: 22.66 KG/M2

## 2020-09-22 PROCEDURE — 99204 OFFICE O/P NEW MOD 45 MIN: CPT | Performed by: ORTHOPAEDIC SURGERY

## 2020-09-22 PROCEDURE — G8427 DOCREV CUR MEDS BY ELIG CLIN: HCPCS | Performed by: ORTHOPAEDIC SURGERY

## 2020-09-22 PROCEDURE — 1036F TOBACCO NON-USER: CPT | Performed by: ORTHOPAEDIC SURGERY

## 2020-09-22 PROCEDURE — G8420 CALC BMI NORM PARAMETERS: HCPCS | Performed by: ORTHOPAEDIC SURGERY

## 2020-09-22 PROCEDURE — 3017F COLORECTAL CA SCREEN DOC REV: CPT | Performed by: ORTHOPAEDIC SURGERY

## 2020-09-22 RX ORDER — INDOMETHACIN 75 MG/1
75 CAPSULE, EXTENDED RELEASE ORAL 2 TIMES DAILY WITH MEALS
COMMUNITY
End: 2021-05-18

## 2020-09-22 NOTE — PROGRESS NOTES
Department of Orthopedic Surgery  [facility] Deven Alicea MD  History and Physical      CHIEF COMPLAINT: Hand pain    HISTORY OF PRESENT ILLNESS:                The patient is a 63 y.o. female who presents with bilateral hand pain. Patient is right-hand dominant. She reports 1 year history of worsening right worse than left hand pain. She is disabled from a back injury. However she did do part-time work in [employer] which required repetitive pinching and pulling with a hand. She denies any specific injuries. She has not been working since the [place] pandemic. However she has worsening pain. She does have numbness and tingling in the bilateral hands every night. She also reports pain in the right thumb index and middle fingers which is quite bothersome to her. She reports she is unable to use the thumb and index finger secondary to pain. She does report clicking and snapping of the fingers. She also reports significant pain particular around the index metacarpophalangeal joint. She relates she was diagnosed with inflammatory arthropathy but does not recall the specific name of this condition. She believes it may be lupus. She did see a rheumatologist in [place]. She was not placed on any disease modifying agents. She was prescribed an anti-inflammatory only. She has no follow-up scheduled for that. She did have an EMG nerve conduction study completed in July 23, 2020. This demonstrated a right median motor latency of 6.3. On the left of 5.63. There is a significant slowing of the left ulnar nerve across the elbow down to 30 m/s. Normal conduction velocities across the elbow on the right. Sensory latency peak on the left was 4.74 and on the right 4.79. EMG portion of the test did demonstrate 3+ changes within the right and left abductor pollicis brevis muscles. There is also left-sided changes within the ulnar nerve innervated muscles. This is 1+ in nature.   She reports the right hand is much worse than the left. She is interested in surgical intervention. .    Past Medical History:        Diagnosis Date    Bilateral carpal tunnel syndrome 7/23/2020    Cervical spondylosis 8/5/2020    Chronic bilateral low back pain with sciatica 7/8/2020    Gout     Lumbar spondylosis 8/5/2020    Numbness and tingling in both hands 7/8/2020    Osteoarthritis     Other idiopathic scoliosis, thoracolumbar region 8/5/2020    Reactive hypoglycemia     Right-sided lacunar infarction ([address] 8/5/2020    Rt. cerebellum, chronic, remote    Scoliosis     Ulnar neuropathy of left upper extremity 7/23/2020     Past Surgical History:        Procedure Laterality Date    HYSTERECTOMY       Current Medications:   No current facility-administered medications for this visit. Allergies:  Penicillins    Social History:   TOBACCO:   reports that she has never smoked. She has never used smokeless tobacco.  ETOH:   reports no history of alcohol use. DRUGS:   reports no history of drug use. ACTIVITIES OF DAILY LIVING:    OCCUPATION:    Family History:   History reviewed. No pertinent family history.     REVIEW OF SYSTEMS:  CONSTITUTIONAL:  negative  EYES:  negative  HEENT:  negative  RESPIRATORY:  negative  CARDIOVASCULAR:  negative  GASTROINTESTINAL:  negative  GENITOURINARY:  negative  INTEGUMENT/BREAST:  negative  HEMATOLOGIC/LYMPHATIC:  negative  ALLERGIC/IMMUNOLOGIC:  negative  ENDOCRINE:  negative  MUSCULOSKELETAL:  gout  NEUROLOGICAL:  negative  BEHAVIOR/PSYCH:  negative    PHYSICAL EXAM:    VITALS:  Temp 96.4 °F (35.8 °C) (Infrared)   Resp 20   Ht 5' 1\" (1.549 m)   Wt 120 lb (54.4 kg)   BMI 22.67 kg/m²   CONSTITUTIONAL:  awake, alert, cooperative, no apparent distress, and appears stated age  EYES:  Lids and lashes normal, pupils equal, round and reactive to light, extra ocular muscles intact, sclera clear, conjunctiva normal  ENT:  Normocephalic, without obvious abnormality, atraumatic, sinuses nontender on palpation, external ears without lesions, oral pharynx with moist mucus membranes, tonsils without erythema or exudates, gums normal and good dentition. NECK:  Supple, symmetrical, trachea midline, no adenopathy, thyroid symmetric, not enlarged and no tenderness, skin normal  LUNGS:  CTA  CARDIOVASCULAR:  RRR  ABDOMEN:  Soft,nttp  CHEST/BREASTS:  deferred  GENITAL/URINARY:  deferred  NEUROLOGIC:  Awake, alert, oriented to name, place and time. Cranial nerves II-XII are grossly intact. Motor is 5 out of 5 bilaterally. Sensory is intact. gait is normal.  MUSCULOSKELETAL:    Right upper extremity: Nontender shoulder and elbow region. Positive tenderness over the carpus and index finger MCP joint. Positive tenderness of the A1 pulleys of the thumb index and middle fingers. Limited range of motion about the index finger significant discomfort. Positive tenderness over the MCP joint with dorsal ulnar fullness. Hypersensitivity over the ulnar nerve at the cubital tunnel and median nerve at the wrist.  Positive Phalen's. Negative Finkelstein's. Negative Wartenberg's cross finger testing. Negative active triggering. Negative atrophy. APB strength and intrinsic strength 5/5. Positive CMC grind test.  Radial, ulnar, median nerves are grossly intact light touch. Left upper extremity: Nontender of the shoulder. Hypersensitivity with positive Tinel's of the cubital tunnel. Nontender of the medial lateral epicondyles. Positive Tinel's at the carpal tunnel. Positive Phalen's maneuver. Negative Finkelstein's. Mildly positive CMC grind test.  Nontender over the A1 pulleys of thumb index middle ring and small fingers. Negative triggering. Negative Wartenberg's cross finger testing. APB strength 5/5. Negative atrophy. 2+ radial pulse. Radial: Currently nerves grossly intact light touch.     DATA:    CBC:   Lab Results   Component Value Date    WBC 5.7 05/04/2020    RBC 4.22 05/04/2020    HGB 13.0 05/04/2020    HCT 38.0 05/04/2020    MCV 90.0 05/04/2020    MCH 30.8 05/04/2020    MCHC 34.2 05/04/2020    RDW 12.0 05/04/2020     05/04/2020    MPV 9.4 05/04/2020     PT/INR:  No results found for: PROTIME, INR    Radiology Review: X-rays of the bilateral wrist AP lateral obliques and carpal tunnel views were 10 today in the office. This demonstrates bilateral thumb basal joint arthritis. Positive arthritis of the right index finger MCP joint. Positive arthritis of multiple DIP joint small joints. Impression office x-rays: Negative for acute fracture dislocations bilateral wrist.  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 Discussed cortisone injections. She reports adverse reactions with cortisone injections in the past.  Specifically she reports a flare reaction after an injection to the left shoulder. After discussion she like to proceed with a right carpal tunnel release and right thumb index and middle trigger releases. At that time may consider injection of cortisone to the thumb basal joint and right index finger MCP joints. If she does well on the right will consider surgery on the left for the carpal tunnel and cubital tunnel. Injections as needed. Lastly discussed surgical treatment options including MCP joint arthroplasty and basal joint arthroplasties if needed. She voiced understanding.       I explained the risks, benefits, alternatives and complications of surgery with the patient including but not limited to the risks of infection, possible damage to nerves, vessels, or tendons, stiffness, loss of range of motion, scar sensitivity, wound healing complications, worsening symptoms, possible need for therapy, as well as the possible need further surgery and unanticipated complications. The patient voiced understanding and all questions were answered. The patient elected to proceed with surgical intervention.      Judy Orr  9/22/2020

## 2020-10-13 ENCOUNTER — PREP FOR PROCEDURE (OUTPATIENT)
Dept: ORTHOPEDIC SURGERY | Age: 65
End: 2020-10-13

## 2020-10-13 RX ORDER — SODIUM CHLORIDE 0.9 % (FLUSH) 0.9 %
10 SYRINGE (ML) INJECTION PRN
Status: CANCELLED | OUTPATIENT
Start: 2020-10-13

## 2020-10-13 RX ORDER — SODIUM CHLORIDE 0.9 % (FLUSH) 0.9 %
10 SYRINGE (ML) INJECTION EVERY 12 HOURS SCHEDULED
Status: CANCELLED | OUTPATIENT
Start: 2020-10-13

## 2020-10-13 RX ORDER — SODIUM CHLORIDE 9 MG/ML
INJECTION, SOLUTION INTRAVENOUS CONTINUOUS
Status: CANCELLED | OUTPATIENT
Start: 2020-10-13

## 2020-10-20 ENCOUNTER — HOSPITAL ENCOUNTER (OUTPATIENT)
Age: 65
Discharge: HOME | End: 2020-10-20
Payer: MEDICARE

## 2020-10-20 LAB
ALBUMIN SERPL-MCNC: 4.4 G/DL (ref 3.5–5.2)
ALP BLD-CCNC: 57 U/L (ref 35–104)
ALT SERPL-CCNC: 14 U/L (ref 0–32)
ANION GAP SERPL CALCULATED.3IONS-SCNC: 7 MMOL/L (ref 7–16)
AST SERPL-CCNC: 18 U/L (ref 0–31)
BILIRUB SERPL-MCNC: 0.8 MG/DL (ref 0–1.2)
BUN BLDV-MCNC: 19 MG/DL (ref 8–23)
CALCIUM SERPL-MCNC: 9.9 MG/DL (ref 8.6–10.2)
CHLORIDE BLD-SCNC: 103 MMOL/L (ref 98–107)
CHOLESTEROL, TOTAL: 212 MG/DL (ref 0–199)
CO2: 29 MMOL/L (ref 22–29)
CREAT SERPL-MCNC: 0.9 MG/DL (ref 0.5–1)
GFR AFRICAN AMERICAN: >60
GFR NON-AFRICAN AMERICAN: >60 ML/MIN/1.73
GLUCOSE BLD-MCNC: 93 MG/DL (ref 74–99)
HDLC SERPL-MCNC: 65 MG/DL
LDL CHOLESTEROL CALCULATED: 123 MG/DL (ref 0–99)
POTASSIUM SERPL-SCNC: 4.4 MMOL/L (ref 3.5–5)
SODIUM BLD-SCNC: 139 MMOL/L (ref 132–146)
TOTAL PROTEIN: 7.9 G/DL (ref 6.4–8.3)
TRIGL SERPL-MCNC: 122 MG/DL (ref 0–149)
TSH SERPL DL<=0.05 MIU/L-ACNC: 2.85 UIU/ML (ref 0.27–4.2)
VLDLC SERPL CALC-MCNC: 24 MG/DL

## 2020-10-20 PROCEDURE — 80061 LIPID PANEL: CPT

## 2020-10-20 PROCEDURE — 84443 ASSAY THYROID STIM HORMONE: CPT

## 2020-10-20 PROCEDURE — 36415 COLL VENOUS BLD VENIPUNCTURE: CPT

## 2020-10-20 PROCEDURE — 80053 COMPREHEN METABOLIC PANEL: CPT

## 2020-10-22 RX ORDER — PHENOL 1.4 %
1 AEROSOL, SPRAY (ML) MUCOUS MEMBRANE DAILY
COMMUNITY

## 2020-10-22 RX ORDER — DENOSUMAB 60 MG/ML
60 INJECTION SUBCUTANEOUS ONCE
COMMUNITY
End: 2021-05-18

## 2020-10-22 NOTE — PROGRESS NOTES
[facility] PRE-ADMISSION TESTING INSTRUCTIONS    The Preadmission Testing patient is instructed accordingly using the following criteria (check applicable):    ARRIVAL INSTRUCTIONS:  [x] Parking the day of Surgery is located in the Main Entrance lot. Upon entering the door, make an immediate right to the surgery reception desk    [x] Bring photo ID and insurance card    [] Bring in a copy of Living will or Durable Power of  papers. [x] Please be sure to arrange transportation to and from the hospital    [x] Please arrange for someone to be with you the remainder of the day due to having anesthesia      GENERAL INSTRUCTIONS:    [x] Nothing by mouth after midnight, including gum, candy, mints or water    [x] You may brush your teeth, but do not swallow any water    [x] Take medications as instructed with 1-2 oz of water    [x] Stop herbal supplements and vitamins 5 days prior to procedure    [] Follow preop dosing of blood thinners per physician instructions    [] Do not take insulin or oral diabetic medications    [] If diabetic and have low blood sugar or feel symptomatic, take 1-2oz apple juice or glucose tablets    [] Bring inhalers day of surgery    [] Bring C-PAP/ Bi-Pap day of surgery    [] Bring urine specimen day of surgery    [x] Soap shower or bath AM of Surgery, no lotion, powders or creams to surgical site    [] Follow bowel prep as instructed per surgeon    [] No tobacco products within 24 hours of surgery     [] No alcohol or illegal drug use within 24 hours of surgery.     [x] Jewelry, body piercing's, eyeglasses, contact lenses and dentures are not permitted into surgery (bring cases)      [x] Please do not wear any nail polish or make up on the day of surgery    [x] If not already done, you can expect a call from registration    [x] If surgeon requests a time change you will be notified the day prior to surgery    [x] If you receive a survey after surgery we would greatly appreciate your comments    [] Parent/guardian of a minor must accompany their child and remain on the premises  the entire time they are under our care     [] Pediatric patients may bring favorite toy, blanket or comfort item with them    [] A caregiver or family member must remain with the patient during their stay if they are mentally handicapped, have dementia, disoriented or unable to use a call light or would be a safety concern if left unattended    [x] Please notify surgeon if you develop any illness between now and time of surgery (cold, cough, sore throat, fever, nausea, vomiting) or any signs of infections  including skin, wounds, and dental.    [] Other instructions    EDUCATIONAL MATERIALS PROVIDED:    [] PAT Preoperative Education Packet/Booklet     [] Medication List    [] Fluoroscopy Information Pamphlet    [] Transfusion bracelet applied with instructions    [] Joint replacement video reviewed    [] Shower with antibacterial soap and use CHG wipes provided the evening before surgery as instructed        Have you been tested for COVID  Yes           Have you been told you were positive for COVID Yes 3-2020  Have you had any known exposure to someone that is positive for COVID No  Do you have a cough                   No              Do you have shortness of breath No                 Do you have a sore throat            No                Are you having chills                    No                Are you having muscle aches. No                    Please come to the hospital wearing a mask and have your significant other wear a mask as well. Both of you should check your temperature before leaving to come here,  if it is 100 or higher please call [phone] for instruction.

## 2020-10-23 ENCOUNTER — HOSPITAL ENCOUNTER (OUTPATIENT)
Age: 65
Discharge: HOME | End: 2020-10-25
Payer: MEDICARE

## 2020-10-23 PROCEDURE — U0003 INFECTIOUS AGENT DETECTION BY NUCLEIC ACID (DNA OR RNA); SEVERE ACUTE RESPIRATORY SYNDROME CORONAVIRUS 2 (SARS-COV-2) (CORONAVIRUS DISEASE [COVID-19]), AMPLIFIED PROBE TECHNIQUE, MAKING USE OF HIGH THROUGHPUT TECHNOLOGIES AS DESCRIBED BY CMS-2020-01-R: HCPCS

## 2020-10-24 LAB
SARS-COV-2: NOT DETECTED
SOURCE: NORMAL

## 2020-10-28 ENCOUNTER — ANESTHESIA EVENT (OUTPATIENT)
Dept: OPERATING ROOM | Age: 65
End: 2020-10-28
Payer: MEDICARE

## 2020-10-28 ENCOUNTER — ANESTHESIA (OUTPATIENT)
Dept: OPERATING ROOM | Age: 65
End: 2020-10-28
Payer: MEDICARE

## 2020-10-28 ENCOUNTER — HOSPITAL ENCOUNTER (OUTPATIENT)
Age: 65
Setting detail: OUTPATIENT SURGERY
Discharge: HOME | End: 2020-10-28
Attending: ORTHOPAEDIC SURGERY | Admitting: ORTHOPAEDIC SURGERY
Payer: MEDICARE

## 2020-10-28 VITALS — SYSTOLIC BLOOD PRESSURE: 91 MMHG | DIASTOLIC BLOOD PRESSURE: 53 MMHG | OXYGEN SATURATION: 82 %

## 2020-10-28 VITALS
TEMPERATURE: 96.5 F | WEIGHT: 120 LBS | RESPIRATION RATE: 19 BRPM | HEART RATE: 78 BPM | HEIGHT: 61 IN | DIASTOLIC BLOOD PRESSURE: 74 MMHG | BODY MASS INDEX: 22.66 KG/M2 | SYSTOLIC BLOOD PRESSURE: 137 MMHG | OXYGEN SATURATION: 96 %

## 2020-10-28 LAB — METER GLUCOSE: 86 MG/DL (ref 74–99)

## 2020-10-28 PROCEDURE — 6370000000 HC RX 637 (ALT 250 FOR IP): Performed by: ANESTHESIOLOGY

## 2020-10-28 PROCEDURE — 7100000010 HC PHASE II RECOVERY - FIRST 15 MIN: Performed by: ORTHOPAEDIC SURGERY

## 2020-10-28 PROCEDURE — 2709999900 HC NON-CHARGEABLE SUPPLY: Performed by: ORTHOPAEDIC SURGERY

## 2020-10-28 PROCEDURE — 6360000002 HC RX W HCPCS

## 2020-10-28 PROCEDURE — 3700000001 HC ADD 15 MINUTES (ANESTHESIA): Performed by: ORTHOPAEDIC SURGERY

## 2020-10-28 PROCEDURE — 3700000000 HC ANESTHESIA ATTENDED CARE: Performed by: ORTHOPAEDIC SURGERY

## 2020-10-28 PROCEDURE — 6360000002 HC RX W HCPCS: Performed by: ORTHOPAEDIC SURGERY

## 2020-10-28 PROCEDURE — 2500000003 HC RX 250 WO HCPCS: Performed by: ORTHOPAEDIC SURGERY

## 2020-10-28 PROCEDURE — 26055 INCISE FINGER TENDON SHEATH: CPT | Performed by: ORTHOPAEDIC SURGERY

## 2020-10-28 PROCEDURE — 7100000011 HC PHASE II RECOVERY - ADDTL 15 MIN: Performed by: ORTHOPAEDIC SURGERY

## 2020-10-28 PROCEDURE — 3600000012 HC SURGERY LEVEL 2 ADDTL 15MIN: Performed by: ORTHOPAEDIC SURGERY

## 2020-10-28 PROCEDURE — 2580000003 HC RX 258: Performed by: PHYSICIAN ASSISTANT

## 2020-10-28 PROCEDURE — 20600 DRAIN/INJ JOINT/BURSA W/O US: CPT | Performed by: ORTHOPAEDIC SURGERY

## 2020-10-28 PROCEDURE — 3600000002 HC SURGERY LEVEL 2 BASE: Performed by: ORTHOPAEDIC SURGERY

## 2020-10-28 PROCEDURE — 2500000003 HC RX 250 WO HCPCS: Performed by: PHYSICIAN ASSISTANT

## 2020-10-28 PROCEDURE — 64721 CARPAL TUNNEL SURGERY: CPT | Performed by: ORTHOPAEDIC SURGERY

## 2020-10-28 PROCEDURE — 82962 GLUCOSE BLOOD TEST: CPT

## 2020-10-28 RX ORDER — SODIUM CHLORIDE 9 MG/ML
INJECTION, SOLUTION INTRAVENOUS CONTINUOUS
Status: DISCONTINUED | OUTPATIENT
Start: 2020-10-28 | End: 2020-10-28 | Stop reason: HOSPADM

## 2020-10-28 RX ORDER — SODIUM CHLORIDE 0.9 % (FLUSH) 0.9 %
10 SYRINGE (ML) INJECTION EVERY 12 HOURS SCHEDULED
Status: DISCONTINUED | OUTPATIENT
Start: 2020-10-28 | End: 2020-10-28 | Stop reason: HOSPADM

## 2020-10-28 RX ORDER — ALPRAZOLAM 0.25 MG/1
0.25 TABLET ORAL NIGHTLY PRN
COMMUNITY
End: 2020-10-29 | Stop reason: SDUPTHER

## 2020-10-28 RX ORDER — CLINDAMYCIN PHOSPHATE 900 MG/50ML
900 INJECTION INTRAVENOUS
Status: COMPLETED | OUTPATIENT
Start: 2020-10-28 | End: 2020-10-28

## 2020-10-28 RX ORDER — MIDAZOLAM HYDROCHLORIDE 1 MG/ML
INJECTION INTRAMUSCULAR; INTRAVENOUS PRN
Status: DISCONTINUED | OUTPATIENT
Start: 2020-10-28 | End: 2020-10-28 | Stop reason: SDUPTHER

## 2020-10-28 RX ORDER — FENTANYL CITRATE 50 UG/ML
INJECTION, SOLUTION INTRAMUSCULAR; INTRAVENOUS PRN
Status: DISCONTINUED | OUTPATIENT
Start: 2020-10-28 | End: 2020-10-28 | Stop reason: SDUPTHER

## 2020-10-28 RX ORDER — OXYCODONE HYDROCHLORIDE AND ACETAMINOPHEN 5; 325 MG/1; MG/1
1 TABLET ORAL ONCE
Status: COMPLETED | OUTPATIENT
Start: 2020-10-28 | End: 2020-10-28

## 2020-10-28 RX ORDER — SODIUM CHLORIDE 0.9 % (FLUSH) 0.9 %
10 SYRINGE (ML) INJECTION PRN
Status: DISCONTINUED | OUTPATIENT
Start: 2020-10-28 | End: 2020-10-28 | Stop reason: HOSPADM

## 2020-10-28 RX ORDER — OXYCODONE HYDROCHLORIDE AND ACETAMINOPHEN 5; 325 MG/1; MG/1
1 TABLET ORAL EVERY 6 HOURS PRN
Qty: 28 TABLET | Refills: 0 | Status: SHIPPED | OUTPATIENT
Start: 2020-10-28 | End: 2020-11-04

## 2020-10-28 RX ORDER — PROPOFOL 10 MG/ML
INJECTION, EMULSION INTRAVENOUS CONTINUOUS PRN
Status: DISCONTINUED | OUTPATIENT
Start: 2020-10-28 | End: 2020-10-28 | Stop reason: SDUPTHER

## 2020-10-28 RX ORDER — HYDROCODONE BITARTRATE AND ACETAMINOPHEN 5; 325 MG/1; MG/1
1 TABLET ORAL ONCE
Status: DISCONTINUED | OUTPATIENT
Start: 2020-10-28 | End: 2020-10-28 | Stop reason: HOSPADM

## 2020-10-28 RX ORDER — LIDOCAINE HYDROCHLORIDE 10 MG/ML
INJECTION, SOLUTION INFILTRATION; PERINEURAL PRN
Status: DISCONTINUED | OUTPATIENT
Start: 2020-10-28 | End: 2020-10-28 | Stop reason: ALTCHOICE

## 2020-10-28 RX ORDER — HYDROCODONE BITARTRATE AND ACETAMINOPHEN 5; 325 MG/1; MG/1
1 TABLET ORAL EVERY 6 HOURS PRN
Qty: 12 TABLET | Refills: 0 | Status: SHIPPED | OUTPATIENT
Start: 2020-10-28 | End: 2020-10-28 | Stop reason: HOSPADM

## 2020-10-28 RX ADMIN — OXYCODONE HYDROCHLORIDE AND ACETAMINOPHEN 1 TABLET: 5; 325 TABLET ORAL at 12:01

## 2020-10-28 RX ADMIN — CLINDAMYCIN IN 5 PERCENT DEXTROSE 900 MG: 18 INJECTION, SOLUTION INTRAVENOUS at 10:43

## 2020-10-28 RX ADMIN — SODIUM CHLORIDE: 9 INJECTION, SOLUTION INTRAVENOUS at 10:42

## 2020-10-28 RX ADMIN — FENTANYL CITRATE 50 MCG: 50 INJECTION, SOLUTION INTRAMUSCULAR; INTRAVENOUS at 10:59

## 2020-10-28 RX ADMIN — PROPOFOL 100 MCG/KG/MIN: 10 INJECTION, EMULSION INTRAVENOUS at 10:46

## 2020-10-28 RX ADMIN — FENTANYL CITRATE 50 MCG: 50 INJECTION, SOLUTION INTRAMUSCULAR; INTRAVENOUS at 10:46

## 2020-10-28 RX ADMIN — MIDAZOLAM 2 MG: 1 INJECTION INTRAMUSCULAR; INTRAVENOUS at 10:42

## 2020-10-28 ASSESSMENT — PULMONARY FUNCTION TESTS
PIF_VALUE: 1
PIF_VALUE: 1
PIF_VALUE: 0
PIF_VALUE: 1
PIF_VALUE: 1
PIF_VALUE: 0
PIF_VALUE: 1
PIF_VALUE: 0
PIF_VALUE: 1
PIF_VALUE: 0
PIF_VALUE: 0
PIF_VALUE: 1
PIF_VALUE: 0
PIF_VALUE: 1

## 2020-10-28 ASSESSMENT — PAIN SCALES - GENERAL: PAINLEVEL_OUTOF10: 6

## 2020-10-28 NOTE — ANESTHESIA POSTPROCEDURE EVALUATION
Department of Anesthesiology  Postprocedure Note    Patient: Galen Stuart  MRN: [id number]  YOB: 1955  Date of evaluation: 10/28/2020  Time:  1:25 PM     Procedure Summary     Date:  10/28/20 Room / Location:  [address]    Anesthesia Start:  1042 Anesthesia Stop:  1134    Procedures:       RIGHT THUMB INDEX AND MIDDLE FINGER TRIGGER RELEASE (Right )      RIGHT CARPAL TUNNEL RELEASE RIGHT THUMB CARPOMETACARPAL AND RIGHT INDEX METACARPOPHALANGEAL INJECTIONS (Right ) Diagnosis:  (RIGHT CARPAL TUNNEL SYNDROME RIGHT THUMB INDEX AND MIDDLE TRIGGER FINGER RIGHT THUMB CARPOMETACARPAL ARTHRITIS RIGHT INDEX FINGER ARTHRITIS)    Surgeon:  Anthony Valles MD Responsible Provider:  Nan Monge MD    Anesthesia Type:  MAC ASA Status:  2          Anesthesia Type: MAC    Francis Phase I: Francis Score: 10    Francis Phase II: Francis Score: 10    Last vitals: Reviewed and per EMR flowsheets.        Anesthesia Post Evaluation    Patient location during evaluation: PACU  Patient participation: complete - patient participated  Level of consciousness: awake and alert  Pain score: 5  Airway patency: patent  Nausea & Vomiting: no vomiting and no nausea  Complications: no  Cardiovascular status: hemodynamically stable  Respiratory status: spontaneous ventilation  Hydration status: stable

## 2020-10-28 NOTE — ANESTHESIA PRE PROCEDURE
tablet Take 200 mg by mouth every 6 hours as needed. Yes Historical Provider, MD       Current medications:    Current Facility-Administered Medications   Medication Dose Route Frequency Provider Last Rate Last Dose    0.9 % sodium chloride infusion   Intravenous Continuous WILFRID Gayle        clindamycin (CLEOCIN) 900 mg in dextrose 5 % 50 mL IVPB  900 mg Intravenous On Call to [address]        sodium chloride flush 0.9 % injection 10 mL  10 mL Intravenous 2 times per day WILFRID Gayle        sodium chloride flush 0.9 % injection 10 mL  10 mL Intravenous PRN WILFRID Gayle           Allergies:     Allergies   Allergen Reactions    Penicillins Rash       Problem List:    Patient Active Problem List   Diagnosis Code    Closed fracture of right fibula S82.401A    Numbness and tingling in both hands R20.0, R20.2    Chronic bilateral low back pain with sciatica M54.40, G89.29    Bilateral carpal tunnel syndrome G56.03    Ulnar neuropathy of left upper extremity G56.22    Localized osteoporosis without current pathological fracture M81.6    Cervical spondylosis M47.812    Lumbar spondylosis M47.816    Other idiopathic scoliosis, thoracolumbar region M41.25    Right-sided lacunar infarction ([facility] [address] I63.81       Past Medical History:        Diagnosis Date    Bilateral carpal tunnel syndrome 7/23/2020    Cervical spondylosis 8/5/2020    Chronic bilateral low back pain with sciatica 7/8/2020    Lumbar spondylosis 8/5/2020    Lupus ([address] 05/2020    follows with Dr. Sebastian Lobato- neurology    Numbness and tingling in both hands 07/08/2020    hands and feet    Osteoarthritis     Other idiopathic scoliosis, thoracolumbar region 8/5/2020    Reactive hypoglycemia     Right-sided lacunar infarction ([address] 08/05/2020    Rt. cerebellum, chronic, remote    Scoliosis     Spondylosis     Ulnar neuropathy of left upper extremity 7/23/2020       Past Surgical History:        Procedure

## 2020-10-28 NOTE — BRIEF OP NOTE
Brief Postoperative Note      Patient: Stefan Plummer  YOB: 1955  MRN: [id number]    Date of Procedure: 10/28/2020    Pre-Op Diagnosis: RIGHT CARPAL TUNNEL SYNDROME RIGHT THUMB INDEX AND MIDDLE TRIGGER FINGER RIGHT THUMB CARPOMETACARPAL ARTHRITIS RIGHT INDEX FINGER ARTHRITIS    Post-Op Diagnosis: Same       Procedure(s):  RIGHT THUMB INDEX AND MIDDLE FINGER TRIGGER RELEASE  RIGHT CARPAL TUNNEL RELEASE RIGHT THUMB CARPOMETACARPAL AND RIGHT INDEX METACARPOPHALANGEAL INJECTIONS    Surgeon(s):  Yara Brian MD    Assistant:  Physician Assistant: WILFRID Christianson  Resident: Sherl Nissen, DO    Anesthesia: Monitor Anesthesia Care    Estimated Blood Loss (mL): Minimal    Complications: None      Electronically signed by Sherl Nissen, DO on 10/28/2020 at 11:33 AM

## 2020-10-28 NOTE — H&P
Department of Orthopedic Surgery  History and Physical        CHIEF COMPLAINT: Hand pain     HISTORY OF PRESENT ILLNESS:                 The patient is a 63 y.o. female who presents with bilateral hand pain. Patient is right-hand dominant. She reports 1 year history of worsening right worse than left hand pain. She is disabled from a back injury. However she did do part-time work in [employer] which required repetitive pinching and pulling with a hand. She denies any specific injuries. She has not been working since the [place] pandemic. However she has worsening pain. She does have numbness and tingling in the bilateral hands every night. She also reports pain in the right thumb index and middle fingers which is quite bothersome to her. She reports she is unable to use the thumb and index finger secondary to pain. She does report clicking and snapping of the fingers. She also reports significant pain particular around the index metacarpophalangeal joint. She relates she was diagnosed with inflammatory arthropathy but does not recall the specific name of this condition. She believes it may be lupus. She did see a rheumatologist in [facility]. She was not placed on any disease modifying agents. She was prescribed an anti-inflammatory only. She has no follow-up scheduled for that. She did have an EMG nerve conduction study completed in July 23, 2020. This demonstrated a right median motor latency of 6.3. On the left of 5.63. There is a significant slowing of the left ulnar nerve across the elbow down to 30 m/s. Normal conduction velocities across the elbow on the right. Sensory latency peak on the left was 4.74 and on the right 4.79. EMG portion of the test did demonstrate 3+ changes within the right and left abductor pollicis brevis muscles. There is also left-sided changes within the ulnar nerve innervated muscles. This is 1+ in nature.   She reports the right hand is much worse than clear, conjunctiva normal  ENT:  Normocephalic, without obvious abnormality, atraumatic, sinuses nontender on palpation, external ears without lesions, oral pharynx with moist mucus membranes, tonsils without erythema or exudates, gums normal and good dentition. NECK:  Supple, symmetrical, trachea midline, no adenopathy, thyroid symmetric, not enlarged and no tenderness, skin normal  LUNGS:  CTA  CARDIOVASCULAR:  RRR  ABDOMEN:  Soft,nttp  CHEST/BREASTS:  deferred  GENITAL/URINARY:  deferred  NEUROLOGIC:  Awake, alert, oriented to name, place and time. Cranial nerves II-XII are grossly intact. Motor is 5 out of 5 bilaterally. Sensory is intact. gait is normal.  MUSCULOSKELETAL:     Right upper extremity: Nontender shoulder and elbow region. Positive tenderness over the carpus and index finger MCP joint. Positive tenderness of the A1 pulleys of the thumb index and middle fingers. Limited range of motion about the index finger significant discomfort. Positive tenderness over the MCP joint with dorsal ulnar fullness. Hypersensitivity over the ulnar nerve at the cubital tunnel and median nerve at the wrist.  Positive Phalen's. Negative Finkelstein's. Negative Wartenberg's cross finger testing. Negative active triggering. Negative atrophy. APB strength and intrinsic strength 5/5. Positive CMC grind test.  Radial, ulnar, median nerves are grossly intact light touch.     Left upper extremity: Nontender of the shoulder. Hypersensitivity with positive Tinel's of the cubital tunnel. Nontender of the medial lateral epicondyles. Positive Tinel's at the carpal tunnel. Positive Phalen's maneuver. Negative Finkelstein's. Mildly positive CMC grind test.  Nontender over the A1 pulleys of thumb index middle ring and small fingers. Negative triggering. Negative Wartenberg's cross finger testing. APB strength 5/5. Negative atrophy. 2+ radial pulse.   Radial: Currently nerves grossly intact light touch.     DATA:    CBC:         Lab Results   Component Value Date     WBC 5.7 05/04/2020     RBC 4.22 05/04/2020     HGB 13.0 05/04/2020     HCT 38.0 05/04/2020     MCV 90.0 05/04/2020     MCH 30.8 05/04/2020     MCHC 34.2 05/04/2020     RDW 12.0 05/04/2020      05/04/2020     MPV 9.4 05/04/2020      PT/INR:  No results found for: PROTIME, INR     Radiology Review: X-rays of the bilateral wrist AP lateral obliques and carpal tunnel views were 10 today in the office. This demonstrates bilateral thumb basal joint arthritis. Positive arthritis of the right index finger MCP joint. Positive arthritis of multiple DIP joint small joints. Impression office x-rays: Negative for acute fracture dislocations bilateral wrist.  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 Discussed cortisone injections. She reports adverse reactions with cortisone injections in the past.  Specifically she reports a flare reaction after an injection to the left shoulder. After discussion she like to proceed with a right carpal tunnel release and right thumb index and middle trigger releases. At that time may consider injection of cortisone to the thumb basal joint and right index finger MCP joints. If she does well on the right will consider surgery on the left for the carpal tunnel and cubital tunnel. Injections as needed.   Lastly discussed surgical treatment options including MCP joint arthroplasty and basal joint arthroplasties if needed. She voiced understanding.        I explained the risks, benefits, alternatives and complications of surgery with the patient including but not limited to the risks of infection, possible damage to nerves, vessels, or tendons, stiffness, loss of range of motion, scar sensitivity, wound healing complications, worsening symptoms, possible need for therapy, as well as the possible need further surgery and unanticipated complications. The patient voiced understanding and all questions were answered. The patient elected to proceed with surgical intervention.      History and Physical Update     Patient was seen and examined. Patient's history and physical was reviewed with the patient. There has been no significant interval changes. The patient was counseled at length about the risks of monik Covid-19 during their perioperative period and any recovery window from their procedure. The patient was made aware that monik Covid-19  may worsen their prognosis for recovering from their procedure  and lend to a higher morbidity and/or mortality risk. All material risks, benefits, and reasonable alternatives including postponing the procedure were discussed. The patient does wish to proceed with the procedure at this time.

## 2020-10-28 NOTE — OP NOTE
Operative Note      Patient: Stefan Plummer  YOB: 1955  MRN: [id number]    Date of Procedure: 10/28/2020    Pre-Op Diagnosis: RIGHT CARPAL TUNNEL SYNDROME RIGHT THUMB INDEX AND MIDDLE TRIGGER FINGER RIGHT THUMB CARPOMETACARPAL ARTHRITIS RIGHT INDEX FINGER ARTHRITIS    Post-Op Diagnosis: Same       Procedure(s):  RIGHT THUMB INDEX AND MIDDLE FINGER TRIGGER RELEASE  RIGHT CARPAL TUNNEL RELEASE RIGHT THUMB CARPOMETACARPAL AND RIGHT INDEX METACARPOPHALANGEAL INJECTIONS    Surgeon(s):  Yara Brian MD    Assistant:   Physician Assistant: WILFRID Christianson  Resident: Sherl Nissen, DO    Anesthesia: Monitor Anesthesia Care    Estimated Blood Loss (mL): Minimal    Complications: None    Specimens:   * No specimens in log *    Implants:  * No implants in log *      Drains: * No LDAs found *    Findings: see details    Detailed Description of Procedure:   DATE OF PROCEDURE: 10/28/2020  SURGEON: YASHIRA Basurto:   1. [facility] (She was present throughout the  procedure. She assisted in the usual preoperative positioning, intraoperative  traction, closure, dressing application. Her assistance expedited the case  and decreased surgical time.)  2. Dr Leesa Contreras resident  PREOPERATIVE DIAGNOSIS:   1. Right carpal tunnel syndrome. 2. Right thumb trigger  3. Right index trigger  4. Right middle trigger  5. Right thumb arthritis  6. Right index finger arthritis    POSTOPERATIVE DIAGNOSIS:  1. Right carpal tunnel syndrome. 2. Right thumb trigger  3. Right index trigger  4. Right middle trigger  5. Right thumb arthritis  6. Right index finger arthritis    OPERATION:   1. Right carpal tunnel release  2. Right thumb trigger release  3. Right index trigger release  4. Right middle trigger release  5. Right thumb CMC joint cortisone injection  6. Right index finger MCP joint cortisone injection    ANESTHESIA:  1. Monitored anesthesia care  2.  Local anesthetic by surgeon within 1 cm of the volar wrist flexion crease was  then created followed by blunt dissection and identification of the  superficial palmar fascia, underlying median nerve, and transverse carpal  ligament. Dissection was performed proximally to identify the contents of  [place] canal, which was reflected off of the distal forearm fascia and  transverse carpal ligament. The transverse carpal ligament was then clearly  identified and released from a proximal to distal direction, decompressing  the carpal tunnel. The median nerve was inspected. There was flattening and  hyperemia to the nerve, consistent with median nerve compression. The  remaining portion of the proximal transverse carpal ligament and the distal  forearm fascia was then released using blunt-tip Metzenbaum scissors with a  distal to proximal slide technique while visualizing and protecting the  underlying median nerve. The median nerve was fully decompressed  throughout its visualized course, including the distal forearm fascia,  through the carpal tunnel and to its level of trifurcation distally. A short oblique incision was made just proximal to the MCP joint flexion crease of the index finger. The radial and ulnar neurovascular bundles were identified and preserved. The A1 pulley was then identified and incised longitudenally. The release was then extended distally to include the proximal extent of the A2 pulley and proximally to include the distal palmar fascia. There was evidence of stenosis of the underlying flexor tendons under the A1 pulley that were decompressed with release of the A1 pulley. With proximal retraction of the underlying tendons, there was full and unhindered flexion of the interphalangeal joint. A short oblique incision was made just proximal to the MCP joint flexion crease of the middle finger. The radial and ulnar neurovascular bundles were identified and preserved.  The A1 pulley was then identified and incised longitudenally. The release was then extended distally to include the proximal extent of the A2 pulley and proximally to include the distal palmar fascia. There was evidence of stenosis of the underlying flexor tendons under the A1 pulley that were decompressed with release of the A1 pulley. With proximal retraction of the underlying tendons, there was full and unhindered flexion of the interphalangeal joint. A short transverse incision was made just proximal to the MCP joint flexion crease of the thumb. The radial and ulnar neurovascular bundles were identified and preserved. The A1 pulley was then identified and incised longitudenally. The release was then extended distally to include the proximal extent of the olbique pulley and proximally to include the distal thenar fascia. There was evidence of stenosis of the underlying flexor tendon under the A1 pulley that were decompressed with release of the A1 pulley. With proximal retraction of the underlying tendon, there was full and unhindered flexion of the interphalangeal joint. The right wrist thumb CMC joint was identified as the injection site. Under sterile conditions, the wrist was injected with a mixture of 1 mL of 1% Lidocaine and 1 mL of 6 mg/mL Betamethasone without complication. The right Index finger MCP joint dorsal aspect was identified as the injection site. Under sterile conditions, the joint was injected with a mixture of 0.5 mL of 1% Lidocaine and 0.5 mL of 6 mg/mL Betamethasone without complication. The tourniquet was then defleated. Hemostasis was achieved with bipolar cautery and the wounds closed with 4-0 Nylon suture. A bulky sterile dressing was applied. The hand and all digits were pink and warm at the conclusion of the case. The patient tolerated the procedure and was taken to the recovery room in stable condition.        Electronically signed by Marlen Bolton MD on 10/28/2020 at 11:48 AM

## 2020-10-28 NOTE — PROGRESS NOTES
Pt was released with a hardcopy script for oxycodone to take home, filled a norco script here and was canceled by shaw, cancelled in [facility] now.

## 2020-11-05 ENCOUNTER — OFFICE VISIT (OUTPATIENT)
Dept: ORTHOPEDIC SURGERY | Age: 65
End: 2020-11-05

## 2020-11-05 VITALS — RESPIRATION RATE: 22 BRPM | WEIGHT: 120 LBS | BODY MASS INDEX: 22.66 KG/M2 | HEIGHT: 61 IN | TEMPERATURE: 97.9 F

## 2020-11-05 PROCEDURE — 99024 POSTOP FOLLOW-UP VISIT: CPT | Performed by: ORTHOPAEDIC SURGERY

## 2020-11-05 NOTE — PATIENT INSTRUCTIONS
Patient Education        Carpal Tunnel Syndrome: Exercises  Introduction  Here are some examples of exercises for you to try. The exercises may be suggested for a condition or for rehabilitation. Start each exercise slowly. Ease off the exercises if you start to have pain. You will be told when to start these exercises and which ones will work best for you. Warm-up stretches  When you no longer have pain or numbness, you can do exercises to help prevent carpal tunnel syndrome from coming back. Do not do any stretch or movement that is uncomfortable or painful. 1. Rotate your wrist up, down, and from side to side. Repeat 4 times. 2. Stretch your fingers far apart. Relax them, and then stretch them again. Repeat 4 times. 3. Stretch your thumb by pulling it back gently, holding it, and then releasing it. Repeat 4 times. How to do the exercises  Prayer stretch   1. Start with your palms together in front of your chest just below your chin. 2. Slowly lower your hands toward your waistline, keeping your hands close to your stomach and your palms together until you feel a mild to moderate stretch under your forearms. 3. Hold for at least 15 to 30 seconds. Repeat 2 to 4 times. Wrist flexor stretch   1. Extend your arm in front of you with your palm up. 2. Bend your wrist, pointing your hand toward the floor. 3. With your other hand, gently bend your wrist farther until you feel a mild to moderate stretch in your forearm. 4. Hold for at least 15 to 30 seconds. Repeat 2 to 4 times. Wrist extensor stretch   1. Repeat steps 1 through 4 of the stretch above, but begin with your extended hand palm down. Follow-up care is a key part of your treatment and safety. Be sure to make and go to all appointments, and call your doctor if you are having problems. It's also a good idea to know your test results and keep a list of the medicines you take. Where can you learn more?   Go to [email or web address] and sign in to your [employer] account. Enter B482 in the [place] box to learn more about \"Carpal Tunnel Syndrome: Exercises. \"     If you do not have an account, please click on the \"Sign Up Now\" link. Current as of: March 2, 2020               Content Version: 12.6  © 2006-2020 [employer]. Care instructions adapted under license by [facility] ([facility]). If you have questions about a medical condition or this instruction, always ask your healthcare professional. [address] any warranty or liability for your use of this information. Patient Education        Finger: Exercises  Introduction  Here are some examples of exercises for you to try. The exercises may be suggested for a condition or for rehabilitation. Start each exercise slowly. Ease off the exercises if you start to have pain. You will be told when to start these exercises and which ones will work best for you. How to do the exercises  Tendon glides   1. In this exercise, the steps follow one another to make a continuous movement. 2. With one hand, point your fingers and thumb straight up. Your wrist should be relaxed, following the line of your fingers and thumb. 3. Curl your fingers so that the top two joints in them are bent, and your fingers wrap down. Your fingertips should touch or be near the base of your fingers. Your fingers will look like a hook. 4. Make a fist by bending your knuckles. Your thumb can gently rest against your index (pointing) finger. 5. Unwind your fingers slightly so that your fingertips can touch the base of your palm. Your thumb can rest against your index finger. Hold that position for about 6 seconds. 6. Move back to your starting position, with your fingers and thumb pointing up. 7. Repeat the series of motions 8 to 12 times. 8. Switch hands, and repeat steps 1 through 6. Thumb flexion/extension   1.  Place your forearm and hand on a table with your thumb pointing up. 2. Bend your thumb downward and across your palm so that your thumb touches the base of your little finger. Hold that position for about 6 seconds. Then straighten your thumb. 3. Repeat 8 to 12 times. 4. Switch hands, and repeat steps 1 through 3. Thumb abduction/adduction   1. With one hand, point your fingers and thumb straight up. Your wrist should be relaxed, following the line of your fingers and thumb. 2. Pull your thumb away from your palm as far as you can. Hold that position for about 6 seconds. Then slowly move your thumb back to the starting position, with your thumb resting against your index (pointing) finger. 3. Repeat 8 to 12 times. 4. Switch hands, and repeat steps 1 through 3. Finger opposition   1. With one hand, point your fingers and thumb straight up. Your wrist should be relaxed, following the line of your fingers and thumb. 2. Touch your thumb to each finger, one finger at a time. This will look like an \"okay\" sign, but try to keep your other fingers straight and pointing upward as much as you can. 3. Repeat 8 to 12 times. 4. Switch hands, and repeat steps 1 through 3. Follow-up care is a key part of your treatment and safety. Be sure to make and go to all appointments, and call your doctor if you are having problems. It's also a good idea to know your test results and keep a list of the medicines you take. Where can you learn more? Go to [email or web address] and sign in to your [employer] account. Enter I223 in the [facility] box to learn more about \"Finger: Exercises. \"     If you do not have an account, please click on the \"Sign Up Now\" link. Current as of: March 2, 2020               Content Version: 12.6  © 9033-0511 [employer]. Care instructions adapted under license by [facility] ([facility]).  If you have questions about a medical condition or this instruction, always ask your healthcare professional. [address] any warranty or liability for your use of this information.

## 2020-11-05 NOTE — PROGRESS NOTES
8 days postop right thumb index and middle as well as carpal tunnel release is doing very well. She is very pleased with the results. Physical exam: Incisions healing nicely. She has full thumb flexion extension. Full index middle ring and small finger digital flexion extension. Thumb index middle ring and small finger sensation intact to light touch. Mild swelling present. Grossly neurovascular intact. Sutures were removed without complication    8 days postop doing well    Plan    Patient is instructed on home exercises, scar management, and increase activities as tolerated. If she does not continue to improve she will contact the office for referral to therapy.   Otherwise may follow-up as needed

## 2021-02-26 ENCOUNTER — HOSPITAL ENCOUNTER (OUTPATIENT)
Age: 66
Discharge: HOME | End: 2021-02-26
Payer: MEDICARE

## 2021-02-26 DIAGNOSIS — E55.9 VITAMIN D DEFICIENCY: ICD-10-CM

## 2021-02-26 DIAGNOSIS — R53.83 OTHER FATIGUE: ICD-10-CM

## 2021-02-26 LAB
TSH SERPL DL<=0.05 MIU/L-ACNC: 1.32 UIU/ML (ref 0.27–4.2)
VITAMIN D 25-HYDROXY: 46 NG/ML (ref 30–100)

## 2021-02-26 PROCEDURE — 36415 COLL VENOUS BLD VENIPUNCTURE: CPT

## 2021-02-26 PROCEDURE — 82306 VITAMIN D 25 HYDROXY: CPT

## 2021-02-26 PROCEDURE — 84443 ASSAY THYROID STIM HORMONE: CPT

## 2021-05-11 ENCOUNTER — HOSPITAL ENCOUNTER (OUTPATIENT)
Age: 66
Discharge: HOME | End: 2021-05-11
Payer: MEDICARE

## 2021-05-11 DIAGNOSIS — R53.83 OTHER FATIGUE: ICD-10-CM

## 2021-05-11 DIAGNOSIS — M85.80 OSTEOPENIA, UNSPECIFIED LOCATION: ICD-10-CM

## 2021-05-11 DIAGNOSIS — E78.00 PURE HYPERCHOLESTEROLEMIA: ICD-10-CM

## 2021-05-11 LAB
ALBUMIN SERPL-MCNC: 4.4 G/DL (ref 3.5–5.2)
ALP BLD-CCNC: 38 U/L (ref 35–104)
ALT SERPL-CCNC: 9 U/L (ref 0–32)
ANION GAP SERPL CALCULATED.3IONS-SCNC: 7 MMOL/L (ref 7–16)
AST SERPL-CCNC: 17 U/L (ref 0–31)
BILIRUB SERPL-MCNC: 0.9 MG/DL (ref 0–1.2)
BUN BLDV-MCNC: 20 MG/DL (ref 6–23)
CALCIUM SERPL-MCNC: 9.4 MG/DL (ref 8.6–10.2)
CHLORIDE BLD-SCNC: 103 MMOL/L (ref 98–107)
CHOLESTEROL, TOTAL: 205 MG/DL (ref 0–199)
CO2: 28 MMOL/L (ref 22–29)
CREAT SERPL-MCNC: 0.9 MG/DL (ref 0.5–1)
GFR AFRICAN AMERICAN: >60
GFR NON-AFRICAN AMERICAN: >60 ML/MIN/1.73
GLUCOSE BLD-MCNC: 90 MG/DL (ref 74–99)
HCT VFR BLD CALC: 37.5 % (ref 34–48)
HDLC SERPL-MCNC: 55 MG/DL
HEMOGLOBIN: 13.1 G/DL (ref 11.5–15.5)
LDL CHOLESTEROL CALCULATED: 125 MG/DL (ref 0–99)
MCH RBC QN AUTO: 31.3 PG (ref 26–35)
MCHC RBC AUTO-ENTMCNC: 34.9 % (ref 32–34.5)
MCV RBC AUTO: 89.5 FL (ref 80–99.9)
PDW BLD-RTO: 12.1 FL (ref 11.5–15)
PLATELET # BLD: 190 E9/L (ref 130–450)
PMV BLD AUTO: 9.9 FL (ref 7–12)
POTASSIUM SERPL-SCNC: 4.1 MMOL/L (ref 3.5–5)
RBC # BLD: 4.19 E12/L (ref 3.5–5.5)
SODIUM BLD-SCNC: 138 MMOL/L (ref 132–146)
TOTAL PROTEIN: 7.1 G/DL (ref 6.4–8.3)
TRIGL SERPL-MCNC: 123 MG/DL (ref 0–149)
VITAMIN D 25-HYDROXY: 61 NG/ML (ref 30–100)
VLDLC SERPL CALC-MCNC: 25 MG/DL
WBC # BLD: 4.2 E9/L (ref 4.5–11.5)

## 2021-05-11 PROCEDURE — 36415 COLL VENOUS BLD VENIPUNCTURE: CPT

## 2021-05-11 PROCEDURE — 85027 COMPLETE CBC AUTOMATED: CPT

## 2021-05-11 PROCEDURE — 82306 VITAMIN D 25 HYDROXY: CPT

## 2021-05-11 PROCEDURE — 80061 LIPID PANEL: CPT

## 2021-05-11 PROCEDURE — 80053 COMPREHEN METABOLIC PANEL: CPT

## 2021-12-03 ENCOUNTER — HOSPITAL ENCOUNTER (OUTPATIENT)
Age: 66
Discharge: HOME | End: 2021-12-03
Payer: MEDICARE

## 2021-12-03 DIAGNOSIS — R53.83 OTHER FATIGUE: ICD-10-CM

## 2021-12-03 DIAGNOSIS — E78.00 PURE HYPERCHOLESTEROLEMIA: ICD-10-CM

## 2021-12-03 DIAGNOSIS — R73.01 IMPAIRED FASTING GLUCOSE: ICD-10-CM

## 2021-12-03 LAB
ALBUMIN SERPL-MCNC: 4.4 G/DL (ref 3.5–5.2)
ALP BLD-CCNC: 37 U/L (ref 35–104)
ALT SERPL-CCNC: 19 U/L (ref 0–32)
ANION GAP SERPL CALCULATED.3IONS-SCNC: 7 MMOL/L (ref 7–16)
AST SERPL-CCNC: 23 U/L (ref 0–31)
BILIRUB SERPL-MCNC: 0.5 MG/DL (ref 0–1.2)
BUN BLDV-MCNC: 19 MG/DL (ref 6–23)
CALCIUM SERPL-MCNC: 9.6 MG/DL (ref 8.6–10.2)
CHLORIDE BLD-SCNC: 105 MMOL/L (ref 98–107)
CHOLESTEROL, TOTAL: 149 MG/DL (ref 0–199)
CO2: 27 MMOL/L (ref 22–29)
CREAT SERPL-MCNC: 0.9 MG/DL (ref 0.5–1)
GFR AFRICAN AMERICAN: >60
GFR NON-AFRICAN AMERICAN: >60 ML/MIN/1.73
GLUCOSE BLD-MCNC: 97 MG/DL (ref 74–99)
HBA1C MFR BLD: 5.1 % (ref 4–5.6)
HDLC SERPL-MCNC: 57 MG/DL
LDL CHOLESTEROL CALCULATED: 73 MG/DL (ref 0–99)
POTASSIUM SERPL-SCNC: 4.3 MMOL/L (ref 3.5–5)
SODIUM BLD-SCNC: 139 MMOL/L (ref 132–146)
TOTAL PROTEIN: 7.3 G/DL (ref 6.4–8.3)
TRIGL SERPL-MCNC: 94 MG/DL (ref 0–149)
TSH SERPL DL<=0.05 MIU/L-ACNC: 2.47 UIU/ML (ref 0.27–4.2)
VLDLC SERPL CALC-MCNC: 19 MG/DL

## 2021-12-03 PROCEDURE — 80053 COMPREHEN METABOLIC PANEL: CPT

## 2021-12-03 PROCEDURE — 83036 HEMOGLOBIN GLYCOSYLATED A1C: CPT

## 2021-12-03 PROCEDURE — 36415 COLL VENOUS BLD VENIPUNCTURE: CPT

## 2021-12-03 PROCEDURE — 84443 ASSAY THYROID STIM HORMONE: CPT

## 2021-12-03 PROCEDURE — 80061 LIPID PANEL: CPT

## 2022-05-31 ENCOUNTER — HOSPITAL ENCOUNTER (OUTPATIENT)
Age: 67
Discharge: HOME | End: 2022-05-31
Payer: MEDICARE

## 2022-05-31 LAB
ALBUMIN SERPL-MCNC: 4.4 G/DL (ref 3.5–5.2)
ALP BLD-CCNC: 39 U/L (ref 35–104)
ALT SERPL-CCNC: 16 U/L (ref 0–32)
ANION GAP SERPL CALCULATED.3IONS-SCNC: 9 MMOL/L (ref 7–16)
AST SERPL-CCNC: 22 U/L (ref 0–31)
BILIRUB SERPL-MCNC: 0.7 MG/DL (ref 0–1.2)
BUN BLDV-MCNC: 15 MG/DL (ref 6–23)
CALCIUM SERPL-MCNC: 9.2 MG/DL (ref 8.6–10.2)
CHLORIDE BLD-SCNC: 104 MMOL/L (ref 98–107)
CHOLESTEROL, FASTING: 135 MG/DL (ref 0–199)
CO2: 24 MMOL/L (ref 22–29)
CREAT SERPL-MCNC: 0.9 MG/DL (ref 0.5–1)
GFR AFRICAN AMERICAN: >60
GFR NON-AFRICAN AMERICAN: >60 ML/MIN/1.73
GLUCOSE BLD-MCNC: 97 MG/DL (ref 74–99)
HDLC SERPL-MCNC: 64 MG/DL
LDL CHOLESTEROL CALCULATED: 54 MG/DL (ref 0–99)
POTASSIUM SERPL-SCNC: 4 MMOL/L (ref 3.5–5)
SODIUM BLD-SCNC: 137 MMOL/L (ref 132–146)
TOTAL PROTEIN: 7.3 G/DL (ref 6.4–8.3)
TRIGLYCERIDE, FASTING: 84 MG/DL (ref 0–149)
TSH SERPL DL<=0.05 MIU/L-ACNC: 1.09 UIU/ML (ref 0.27–4.2)
VLDLC SERPL CALC-MCNC: 17 MG/DL

## 2022-05-31 PROCEDURE — 80053 COMPREHEN METABOLIC PANEL: CPT

## 2022-05-31 PROCEDURE — 36415 COLL VENOUS BLD VENIPUNCTURE: CPT

## 2022-05-31 PROCEDURE — 84443 ASSAY THYROID STIM HORMONE: CPT

## 2022-05-31 PROCEDURE — 80061 LIPID PANEL: CPT

## 2022-11-30 ENCOUNTER — HOSPITAL ENCOUNTER (OUTPATIENT)
Age: 67
Discharge: HOME | End: 2022-11-30
Payer: MEDICARE

## 2022-11-30 LAB
ALBUMIN SERPL-MCNC: 4.3 G/DL (ref 3.5–5.2)
ALP BLD-CCNC: 39 U/L (ref 35–104)
ALT SERPL-CCNC: 14 U/L (ref 0–32)
ANION GAP SERPL CALCULATED.3IONS-SCNC: 8 MMOL/L (ref 7–16)
AST SERPL-CCNC: 23 U/L (ref 0–31)
BACTERIA: NORMAL /HPF
BASOPHILS ABSOLUTE: 0.04 E9/L (ref 0–0.2)
BASOPHILS RELATIVE PERCENT: 0.8 % (ref 0–2)
BILIRUB SERPL-MCNC: 0.9 MG/DL (ref 0–1.2)
BILIRUBIN URINE: NEGATIVE
BLOOD, URINE: NORMAL
BUN BLDV-MCNC: 19 MG/DL (ref 6–23)
CALCIUM SERPL-MCNC: 9.6 MG/DL (ref 8.6–10.2)
CHLORIDE BLD-SCNC: 102 MMOL/L (ref 98–107)
CHOLESTEROL, FASTING: 148 MG/DL (ref 0–199)
CLARITY: CLEAR
CO2: 27 MMOL/L (ref 22–29)
COLOR: YELLOW
CREAT SERPL-MCNC: 0.9 MG/DL (ref 0.5–1)
EOSINOPHILS ABSOLUTE: 0.18 E9/L (ref 0.05–0.5)
EOSINOPHILS RELATIVE PERCENT: 3.7 % (ref 0–6)
GFR SERPL CREATININE-BSD FRML MDRD: >60 ML/MIN/1.73
GLUCOSE BLD-MCNC: 81 MG/DL (ref 74–99)
GLUCOSE URINE: NEGATIVE MG/DL
HCT VFR BLD CALC: 39.8 % (ref 34–48)
HDLC SERPL-MCNC: 62 MG/DL
HEMOGLOBIN: 13.2 G/DL (ref 11.5–15.5)
IMMATURE GRANULOCYTES #: 0.01 E9/L
IMMATURE GRANULOCYTES %: 0.2 % (ref 0–5)
KETONES, URINE: NEGATIVE MG/DL
LDL CHOLESTEROL CALCULATED: 71 MG/DL (ref 0–99)
LEUKOCYTE ESTERASE, URINE: NEGATIVE
LYMPHOCYTES ABSOLUTE: 1.69 E9/L (ref 1.5–4)
LYMPHOCYTES RELATIVE PERCENT: 34.7 % (ref 20–42)
MCH RBC QN AUTO: 29.9 PG (ref 26–35)
MCHC RBC AUTO-ENTMCNC: 33.2 % (ref 32–34.5)
MCV RBC AUTO: 90 FL (ref 80–99.9)
MONOCYTES ABSOLUTE: 0.44 E9/L (ref 0.1–0.95)
MONOCYTES RELATIVE PERCENT: 9 % (ref 2–12)
NEUTROPHILS ABSOLUTE: 2.51 E9/L (ref 1.8–7.3)
NEUTROPHILS RELATIVE PERCENT: 51.6 % (ref 43–80)
NITRITE, URINE: NEGATIVE
PDW BLD-RTO: 12 FL (ref 11.5–15)
PH UA: 7.5 (ref 5–9)
PLATELET # BLD: 175 E9/L (ref 130–450)
PMV BLD AUTO: 9.6 FL (ref 7–12)
POTASSIUM SERPL-SCNC: 3.9 MMOL/L (ref 3.5–5)
PROTEIN UA: NEGATIVE MG/DL
RBC # BLD: 4.42 E12/L (ref 3.5–5.5)
RBC UA: NORMAL /HPF (ref 0–2)
SODIUM BLD-SCNC: 137 MMOL/L (ref 132–146)
SPECIFIC GRAVITY UA: 1.01 (ref 1–1.03)
TOTAL PROTEIN: 7.1 G/DL (ref 6.4–8.3)
TRIGLYCERIDE, FASTING: 73 MG/DL (ref 0–149)
TSH SERPL DL<=0.05 MIU/L-ACNC: 1.35 UIU/ML (ref 0.27–4.2)
UROBILINOGEN, URINE: 0.2 E.U./DL
VITAMIN D 25-HYDROXY: 84 NG/ML (ref 30–100)
VLDLC SERPL CALC-MCNC: 15 MG/DL
WBC # BLD: 4.9 E9/L (ref 4.5–11.5)
WBC UA: NORMAL /HPF (ref 0–5)

## 2022-11-30 PROCEDURE — 80061 LIPID PANEL: CPT

## 2022-11-30 PROCEDURE — 85025 COMPLETE CBC W/AUTO DIFF WBC: CPT

## 2022-11-30 PROCEDURE — 82306 VITAMIN D 25 HYDROXY: CPT

## 2022-11-30 PROCEDURE — 81001 URINALYSIS AUTO W/SCOPE: CPT

## 2022-11-30 PROCEDURE — 80053 COMPREHEN METABOLIC PANEL: CPT

## 2022-11-30 PROCEDURE — 84443 ASSAY THYROID STIM HORMONE: CPT

## 2022-11-30 PROCEDURE — 36415 COLL VENOUS BLD VENIPUNCTURE: CPT

## 2025-02-26 ENCOUNTER — HOSPITAL ENCOUNTER (OUTPATIENT)
Age: 70
Discharge: HOME | End: 2025-02-26
Payer: MEDICARE

## 2025-02-26 LAB
C3 SERPL-MCNC: 138 MG/DL (ref 90–180)
C4 SERPL-MCNC: 23 MG/DL (ref 10–40)
CK SERPL-CCNC: 67 U/L (ref 20–180)
CRP SERPL HS-MCNC: <3 MG/L (ref 0–5)
ERYTHROCYTE [SEDIMENTATION RATE] IN BLOOD BY WESTERGREN METHOD: 10 MM/HR (ref 0–20)

## 2025-02-26 PROCEDURE — 86160 COMPLEMENT ANTIGEN: CPT

## 2025-02-26 PROCEDURE — 86038 ANTINUCLEAR ANTIBODIES: CPT

## 2025-02-26 PROCEDURE — 86376 MICROSOMAL ANTIBODY EACH: CPT

## 2025-02-26 PROCEDURE — 86334 IMMUNOFIX E-PHORESIS SERUM: CPT

## 2025-02-26 PROCEDURE — 86140 C-REACTIVE PROTEIN: CPT

## 2025-02-26 PROCEDURE — 86235 NUCLEAR ANTIGEN ANTIBODY: CPT

## 2025-02-26 PROCEDURE — 86225 DNA ANTIBODY NATIVE: CPT

## 2025-02-26 PROCEDURE — 85652 RBC SED RATE AUTOMATED: CPT

## 2025-02-26 PROCEDURE — 36415 COLL VENOUS BLD VENIPUNCTURE: CPT

## 2025-02-26 PROCEDURE — 82550 ASSAY OF CK (CPK): CPT

## 2025-02-26 PROCEDURE — 83516 IMMUNOASSAY NONANTIBODY: CPT

## 2025-02-26 PROCEDURE — 86039 ANTINUCLEAR ANTIBODIES (ANA): CPT

## 2025-02-26 PROCEDURE — 84165 PROTEIN E-PHORESIS SERUM: CPT

## 2025-02-26 PROCEDURE — 86800 THYROGLOBULIN ANTIBODY: CPT

## 2025-02-26 PROCEDURE — 84155 ASSAY OF PROTEIN SERUM: CPT

## 2025-02-27 LAB
ANA SER QL IA: NEGATIVE
ANTI DNA DOUBLE STRANDED: NEGATIVE
ENA SM AB SER QL: NEGATIVE
U1 SNRNP IGG SER-ACNC: NEGATIVE

## 2025-02-28 LAB
ALBUMIN SERPL-MCNC: 3.5 G/DL (ref 3.5–4.7)
ALPHA1 GLOB SERPL ELPH-MCNC: 0.3 G/DL (ref 0.2–0.4)
ALPHA2 GLOB SERPL ELPH-MCNC: 0.8 G/DL (ref 0.5–1)
B-GLOBULIN SERPL ELPH-MCNC: 1 G/DL (ref 0.8–1.3)
ENA SS-A IGG SER QL: <0.3 U/ML
ENA SS-B IGG SER IA-ACNC: <0.3 U/ML
GAMMA GLOB SERPL ELPH-MCNC: 1.3 G/DL (ref 0.7–1.6)
INTERPRETATION SERPL IFE-IMP: NORMAL
PATH REV: NORMAL
PATHOLOGIST: NORMAL
PROT PATTERN SERPL ELPH-IMP: NORMAL
PROT SERPL-MCNC: 6.9 G/DL (ref 6.4–8.3)
THYROGLOBULIN AB: 17 IU/ML (ref 0–40)
THYROPEROXIDASE AB SERPL IA-ACNC: <4 IU/ML (ref 0–25)

## 2025-03-01 LAB
DEPRECATED S PNEUM 1 IGG SER-MCNC: 0 AU/ML (ref 0–19)
SERINE PROTEASE 3, IGG: 2 AU/ML (ref 0–19)

## 2025-04-10 ENCOUNTER — HOSPITAL ENCOUNTER (OUTPATIENT)
Age: 70
Discharge: HOME | End: 2025-04-12

## 2025-04-10 LAB
ABO + RH BLD: NORMAL
ARM BAND NUMBER: NORMAL
BLOOD BANK SAMPLE EXPIRATION: NORMAL
BLOOD GROUP ANTIBODIES SERPL: NEGATIVE

## 2025-04-10 PROCEDURE — 86901 BLOOD TYPING SEROLOGIC RH(D): CPT

## 2025-04-10 PROCEDURE — 86850 RBC ANTIBODY SCREEN: CPT

## 2025-04-10 PROCEDURE — 87081 CULTURE SCREEN ONLY: CPT

## 2025-04-10 PROCEDURE — 86900 BLOOD TYPING SEROLOGIC ABO: CPT

## 2025-04-12 LAB
MICROORGANISM SPEC CULT: NORMAL
SPECIMEN DESCRIPTION: NORMAL

## 2025-04-16 ENCOUNTER — HOSPITAL ENCOUNTER (OUTPATIENT)
Age: 70
Discharge: HOME | End: 2025-04-18

## 2025-04-16 LAB
ANION GAP SERPL CALCULATED.3IONS-SCNC: 12 MMOL/L (ref 7–16)
BUN SERPL-MCNC: 15 MG/DL (ref 6–23)
CALCIUM SERPL-MCNC: 9.8 MG/DL (ref 8.6–10.2)
CHLORIDE SERPL-SCNC: 99 MMOL/L (ref 98–107)
CO2 SERPL-SCNC: 26 MMOL/L (ref 22–29)
CREAT SERPL-MCNC: 0.8 MG/DL (ref 0.5–1)
ERYTHROCYTE [DISTWIDTH] IN BLOOD BY AUTOMATED COUNT: 12.6 % (ref 11.5–15)
GFR, ESTIMATED: 87 ML/MIN/1.73M2
GLUCOSE SERPL-MCNC: 118 MG/DL (ref 74–99)
HCT VFR BLD AUTO: 32.2 % (ref 34–48)
HGB BLD-MCNC: 10.6 G/DL (ref 11.5–15.5)
MCH RBC QN AUTO: 30.5 PG (ref 26–35)
MCHC RBC AUTO-ENTMCNC: 32.9 G/DL (ref 32–34.5)
MCV RBC AUTO: 92.5 FL (ref 80–99.9)
PLATELET # BLD AUTO: 210 K/UL (ref 130–450)
PMV BLD AUTO: 10.6 FL (ref 7–12)
POTASSIUM SERPL-SCNC: 4 MMOL/L (ref 3.5–5)
RBC # BLD AUTO: 3.48 M/UL (ref 3.5–5.5)
SODIUM SERPL-SCNC: 137 MMOL/L (ref 132–146)
WBC OTHER # BLD: 12.3 K/UL (ref 4.5–11.5)

## 2025-04-16 PROCEDURE — 80048 BASIC METABOLIC PNL TOTAL CA: CPT

## 2025-04-16 PROCEDURE — 85027 COMPLETE CBC AUTOMATED: CPT

## 2025-04-17 ENCOUNTER — HOSPITAL ENCOUNTER (OUTPATIENT)
Age: 70
Discharge: HOME | End: 2025-04-19

## 2025-04-17 LAB
ANION GAP SERPL CALCULATED.3IONS-SCNC: 10 MMOL/L (ref 7–16)
BUN SERPL-MCNC: 17 MG/DL (ref 6–23)
CALCIUM SERPL-MCNC: 9.9 MG/DL (ref 8.6–10.2)
CHLORIDE SERPL-SCNC: 99 MMOL/L (ref 98–107)
CO2 SERPL-SCNC: 27 MMOL/L (ref 22–29)
CREAT SERPL-MCNC: 0.8 MG/DL (ref 0.5–1)
ERYTHROCYTE [DISTWIDTH] IN BLOOD BY AUTOMATED COUNT: 13 % (ref 11.5–15)
GFR, ESTIMATED: 77 ML/MIN/1.73M2
GLUCOSE SERPL-MCNC: 107 MG/DL (ref 74–99)
HCT VFR BLD AUTO: 30.9 % (ref 34–48)
HGB BLD-MCNC: 10.4 G/DL (ref 11.5–15.5)
MCH RBC QN AUTO: 30.4 PG (ref 26–35)
MCHC RBC AUTO-ENTMCNC: 33.7 G/DL (ref 32–34.5)
MCV RBC AUTO: 90.4 FL (ref 80–99.9)
PLATELET # BLD AUTO: 166 K/UL (ref 130–450)
PMV BLD AUTO: 11 FL (ref 7–12)
POTASSIUM SERPL-SCNC: 3.7 MMOL/L (ref 3.5–5)
RBC # BLD AUTO: 3.42 M/UL (ref 3.5–5.5)
SODIUM SERPL-SCNC: 136 MMOL/L (ref 132–146)
WBC OTHER # BLD: 10.4 K/UL (ref 4.5–11.5)

## 2025-04-17 PROCEDURE — 80048 BASIC METABOLIC PNL TOTAL CA: CPT

## 2025-04-17 PROCEDURE — 85027 COMPLETE CBC AUTOMATED: CPT

## (undated) DEVICE — TRAY SET HAND REUSABLE

## (undated) DEVICE — SURGICAL PROCEDURE PACK HND

## (undated) DEVICE — GLOVE SURG SZ 65 THK91MIL LTX FREE SYN POLYISOPRENE

## (undated) DEVICE — 4-PORT MANIFOLD: Brand: NEPTUNE 2

## (undated) DEVICE — SOLUTION IV IRRIG POUR BRL 0.9% SODIUM CHL 2F7124

## (undated) DEVICE — DOUBLE BASIN SET: Brand: MEDLINE INDUSTRIES, INC.

## (undated) DEVICE — GOWN,SIRUS,FABRNF,L,20/CS: Brand: MEDLINE

## (undated) DEVICE — GOWN,SIRUS,FABRNF,2XL,18/CS: Brand: MEDLINE

## (undated) DEVICE — GLOVE SURG SZ 6 THK91MIL LTX FREE SYN POLYISOPRENE ANTI

## (undated) DEVICE — GLOVE ORANGE PI 8 1/2   MSG9085

## (undated) DEVICE — PADDING UNDERCAST W4INXL4YD COT FBR LO LINTING WYTEX

## (undated) DEVICE — PADDING,UNDERCAST,COTTON, 4"X4YD STERILE: Brand: MEDLINE